# Patient Record
Sex: FEMALE | Race: BLACK OR AFRICAN AMERICAN | NOT HISPANIC OR LATINO | Employment: FULL TIME | URBAN - METROPOLITAN AREA
[De-identification: names, ages, dates, MRNs, and addresses within clinical notes are randomized per-mention and may not be internally consistent; named-entity substitution may affect disease eponyms.]

---

## 2024-03-07 ENCOUNTER — TELEPHONE (OUTPATIENT)
Dept: FAMILY MEDICINE | Facility: CLINIC | Age: 34
End: 2024-03-07

## 2024-03-07 ENCOUNTER — OFFICE VISIT (OUTPATIENT)
Dept: FAMILY MEDICINE | Facility: CLINIC | Age: 34
End: 2024-03-07
Payer: COMMERCIAL

## 2024-03-07 VITALS
DIASTOLIC BLOOD PRESSURE: 76 MMHG | TEMPERATURE: 98 F | HEIGHT: 68 IN | SYSTOLIC BLOOD PRESSURE: 119 MMHG | HEART RATE: 87 BPM | BODY MASS INDEX: 33.04 KG/M2 | OXYGEN SATURATION: 99 % | WEIGHT: 218 LBS

## 2024-03-07 DIAGNOSIS — R30.0 DYSURIA: ICD-10-CM

## 2024-03-07 DIAGNOSIS — Z32.01 PREGNANCY TEST POSITIVE: ICD-10-CM

## 2024-03-07 DIAGNOSIS — N89.8 VAGINAL DISCHARGE: Primary | ICD-10-CM

## 2024-03-07 DIAGNOSIS — E55.9 VITAMIN D DEFICIENCY: ICD-10-CM

## 2024-03-07 DIAGNOSIS — D50.9 IRON DEFICIENCY ANEMIA, UNSPECIFIED IRON DEFICIENCY ANEMIA TYPE: ICD-10-CM

## 2024-03-07 PROBLEM — N93.9 ABNORMAL UTERINE BLEEDING: Status: ACTIVE | Noted: 2017-04-03

## 2024-03-07 PROBLEM — M25.561 CHRONIC PAIN OF RIGHT KNEE: Status: ACTIVE | Noted: 2019-10-31

## 2024-03-07 PROBLEM — E66.9 OBESITY (BMI 30-39.9): Status: ACTIVE | Noted: 2017-04-03

## 2024-03-07 PROBLEM — G89.29 CHRONIC PAIN OF RIGHT KNEE: Status: ACTIVE | Noted: 2019-10-31

## 2024-03-07 LAB
ALBUMIN UR-MCNC: NEGATIVE MG/DL
APPEARANCE UR: CLEAR
BASOPHILS # BLD AUTO: 0.1 10E3/UL (ref 0–0.2)
BASOPHILS NFR BLD AUTO: 1 %
BILIRUB UR QL STRIP: NEGATIVE
CLUE CELLS: ABNORMAL
COLOR UR AUTO: YELLOW
EOSINOPHIL # BLD AUTO: 0.1 10E3/UL (ref 0–0.7)
EOSINOPHIL NFR BLD AUTO: 2 %
ERYTHROCYTE [DISTWIDTH] IN BLOOD BY AUTOMATED COUNT: 21.2 % (ref 10–15)
GLUCOSE UR STRIP-MCNC: NEGATIVE MG/DL
HCG UR QL: POSITIVE
HCT VFR BLD AUTO: 23.4 % (ref 35–47)
HGB BLD-MCNC: 6.3 G/DL (ref 11.7–15.7)
HGB UR QL STRIP: NEGATIVE
IMM GRANULOCYTES # BLD: 0 10E3/UL
IMM GRANULOCYTES NFR BLD: 0 %
KETONES UR STRIP-MCNC: NEGATIVE MG/DL
LEUKOCYTE ESTERASE UR QL STRIP: NEGATIVE
LYMPHOCYTES # BLD AUTO: 2.7 10E3/UL (ref 0.8–5.3)
LYMPHOCYTES NFR BLD AUTO: 31 %
MCH RBC QN AUTO: 15.1 PG (ref 26.5–33)
MCHC RBC AUTO-ENTMCNC: 26.9 G/DL (ref 31.5–36.5)
MCV RBC AUTO: 56 FL (ref 78–100)
MONOCYTES # BLD AUTO: 0.5 10E3/UL (ref 0–1.3)
MONOCYTES NFR BLD AUTO: 6 %
NEUTROPHILS # BLD AUTO: 5.3 10E3/UL (ref 1.6–8.3)
NEUTROPHILS NFR BLD AUTO: 61 %
NITRATE UR QL: NEGATIVE
PH UR STRIP: 6 [PH] (ref 5–8)
PLATELET # BLD AUTO: 834 10E3/UL (ref 150–450)
RBC # BLD AUTO: 4.16 10E6/UL (ref 3.8–5.2)
SP GR UR STRIP: 1.02 (ref 1–1.03)
TRICHOMONAS, WET PREP: ABNORMAL
UROBILINOGEN UR STRIP-ACNC: 0.2 E.U./DL
WBC # BLD AUTO: 8.8 10E3/UL (ref 4–11)
WBC'S/HIGH POWER FIELD, WET PREP: ABNORMAL
YEAST, WET PREP: ABNORMAL

## 2024-03-07 PROCEDURE — 82306 VITAMIN D 25 HYDROXY: CPT | Performed by: FAMILY MEDICINE

## 2024-03-07 PROCEDURE — 87210 SMEAR WET MOUNT SALINE/INK: CPT | Performed by: FAMILY MEDICINE

## 2024-03-07 PROCEDURE — 81025 URINE PREGNANCY TEST: CPT | Performed by: FAMILY MEDICINE

## 2024-03-07 PROCEDURE — 99203 OFFICE O/P NEW LOW 30 MIN: CPT | Performed by: FAMILY MEDICINE

## 2024-03-07 PROCEDURE — 81003 URINALYSIS AUTO W/O SCOPE: CPT | Performed by: FAMILY MEDICINE

## 2024-03-07 PROCEDURE — 83540 ASSAY OF IRON: CPT | Performed by: FAMILY MEDICINE

## 2024-03-07 PROCEDURE — 83550 IRON BINDING TEST: CPT | Performed by: FAMILY MEDICINE

## 2024-03-07 PROCEDURE — 36415 COLL VENOUS BLD VENIPUNCTURE: CPT | Performed by: FAMILY MEDICINE

## 2024-03-07 PROCEDURE — 85025 COMPLETE CBC W/AUTO DIFF WBC: CPT | Performed by: FAMILY MEDICINE

## 2024-03-07 PROCEDURE — 82728 ASSAY OF FERRITIN: CPT | Performed by: FAMILY MEDICINE

## 2024-03-07 RX ORDER — DOCUSATE SODIUM 100 MG/1
1 CAPSULE, LIQUID FILLED ORAL DAILY
COMMUNITY
Start: 2023-09-07 | End: 2024-03-07

## 2024-03-07 RX ORDER — FERROUS SULFATE 325(65) MG
325 TABLET ORAL 2 TIMES DAILY
Qty: 180 TABLET | Refills: 3 | Status: SHIPPED | OUTPATIENT
Start: 2024-03-07

## 2024-03-07 RX ORDER — AZELAIC ACID 0.15 G/G
GEL TOPICAL 2 TIMES DAILY
COMMUNITY
Start: 2023-08-28 | End: 2024-03-07

## 2024-03-07 RX ORDER — CICLOPIROX 1 G/100ML
SHAMPOO TOPICAL
COMMUNITY
Start: 2023-08-28 | End: 2024-03-07

## 2024-03-07 RX ORDER — TRETINOIN 0.25 MG/G
CREAM TOPICAL
COMMUNITY
Start: 2023-08-28 | End: 2024-03-07

## 2024-03-07 RX ORDER — CHOLECALCIFEROL (VITAMIN D3) 1250 MCG
50000 CAPSULE ORAL
COMMUNITY
Start: 2023-09-07 | End: 2024-03-07

## 2024-03-07 RX ORDER — CLINDAMYCIN PHOSPHATE 10 UG/ML
LOTION TOPICAL EVERY MORNING
COMMUNITY
Start: 2023-08-28 | End: 2024-03-07

## 2024-03-07 RX ORDER — FLUOCINOLONE ACETONIDE 0.11 MG/ML
OIL TOPICAL
COMMUNITY
Start: 2023-08-29 | End: 2024-03-07

## 2024-03-07 RX ORDER — BETAMETHASONE DIPROPIONATE 0.5 MG/G
LOTION TOPICAL 2 TIMES DAILY
COMMUNITY
Start: 2023-08-28 | End: 2024-03-07

## 2024-03-07 RX ORDER — FLUCONAZOLE 150 MG/1
1 TABLET ORAL DAILY
COMMUNITY
Start: 2023-08-03 | End: 2024-03-07

## 2024-03-07 RX ORDER — VITAMIN B COMPLEX
2000 TABLET ORAL
COMMUNITY

## 2024-03-07 RX ORDER — MINOXIDIL 2.5 MG/1
2.5 TABLET ORAL DAILY
COMMUNITY
Start: 2023-08-28 | End: 2024-03-07

## 2024-03-07 ASSESSMENT — PAIN SCALES - GENERAL: PAINLEVEL: NO PAIN (0)

## 2024-03-07 NOTE — TELEPHONE ENCOUNTER
Unplanned pregnancy detected today in office visit   Pregnancy test positive  Unexpected pregnancy, LMP 24 (6 weeks into pregnancy). She is interested in aborting pregnancy. Urgent referral to OB per her request.   6w1d by LMP    Pre-Medication  Assessment:    Tiffaine Lopez does not wish to proceed with this unplanned pregnancy. Did not give a reason why.      When was the first day of your last period? Patient's last menstrual period was 2024 (exact date). Patient is sure of LMP date.    When was your positive pregnancy test? 3/7/2024       Was the test more than 52 days ago (before 1/15/24)? No    Were you using hormonal birth control, an IUD or emergency contraception when you got pregnant? No    Have you ever been diagnosed with an ectopic pregnancy? No    Have you ever had a tubal ligation or sterilization procedure? No    Have you ever been diagnosed with pelvic inflammatory disease? No    Are you having one-sided pelvic pain? No    How long are your typical menstrual cycles /  How many days from the start of one period to the start of the next one?  30-31 days     During the past 3 months, has the time between periods varied from your typical cycles by more than a few days? No, cycles have been regular.    Was LMP more than 10 weeks (70 days) ago (before 23)? No    Did your last period start earlier or later than you would have expected? No    Was your last period shorter than usual? No    Was the amount of bleeding/cramping in your last period normal for you? Yes    Have you had any other recent bleeding that was not normal for you? No      Have you ever been diagnosed with:    An allergy or intolerance to mifepristone or misoprostol?  No    Chronic renal failure?  No    Hemorrhagic disorders? No    Inherited porphyria? No    Have you used systemic corticosteroid therapy long term?  No    Are you currently on anticoagulation therapy (excluding aspirin)?  No    Based on the  responses given by the patient, an ultrasound is not indicated.    Patient denies all contraindications, will proceed with scheduling medication termination of pregnancy appointment.    Pt does not need US; scheduled with a TOP provider Dr Mustafa in [place] 3/8 at 1100  Explained what to expect.    Mallorie Upton RN

## 2024-03-07 NOTE — PROGRESS NOTES
"  Assessment & Plan     Vaginal discharge  Check wet prep for evaluation of yeast versus BV.  Based on results we will treat appropriately.  Could also be related to pregnancy.  - Wet prep - Clinic Collect    Dysuria  UA negative in clinic.  - UA Macroscopic with reflex to Microscopic and Culture; Future    Iron deficiency anemia, unspecified iron deficiency anemia type  Check iron studies and notify patient with results.  Patient may need to be on ferrous sulfate 325 mg daily with food.  Hemoglobin 6.3. Advised iron supplementation and 1 month follow up for repeat CBC. She may need blood transfusion if she needs a termination of pregnancy, I will defer to OB for their opinion.  - Ferritin; Future  - CBC with Platelets & Differential; Future  - Iron and iron binding capacity; Future    Vitamin D deficiency  Check vitamin D.  If vitamin D level is normal then she can transition to vitamin D3 1000 IU daily with food.  - Vitamin D Deficiency; Future    Pregnancy test positive  Unexpected pregnancy, LMP 1/24/24 (6 weeks into pregnancy). She is interested in aborting pregnancy. Urgent referral to OB per her request.               BMI  Estimated body mass index is 33.15 kg/m  as calculated from the following:    Height as of this encounter: 1.727 m (5' 8\").    Weight as of this encounter: 98.9 kg (218 lb).   Weight management plan: Patient was referred to their PCP to discuss a diet and exercise plan.          Asif Moreno is a 33 year old, presenting for the following health issues:  UTI (Experiencing frequent urination, burning sensation, concern for yeast infection, cloudy urine), Anemia (Was told to take iron supplements, has only been taking colace due to confusion with prescription), and Medication Request (Would like to discuss starting birth control pills)        3/7/2024    10:41 AM   Additional Questions   Roomed by [facility]ia, Visit Facilitator   Accompanied by Son     UTI    History of Present Illness " "      Reason for visit:  Uti or yeast    She eats 0-1 servings of fruits and vegetables daily.She consumes 1 sweetened beverage(s) daily.She exercises with enough effort to increase her heart rate 10 to 19 minutes per day.  She exercises with enough effort to increase her heart rate 3 or less days per week. She is missing 2 dose(s) of medications per week.     Vaginal discharge: Patient has noticed increased vaginal discharge, which is similar to previous yeast infections in the past.  1 week ago she was seen for virtual visit at [facility] and prescribed fluconazole 2 doses, which did help symptoms slightly, but there is still some white discharge.  She denies any odor.  There is some suprapubic pressure after urinating.    Anemia: Went to the dermatologist and had blood test that showed iron deficiency. She was has been taking Colace thinking it was iron tablets for about the last 6 months, but 2 weeks ago realized that it was not iron that she was taking. She has history of iron deficiency. Periods sometimes are heavy for 1-2 days.    Vitamin D: She has been taking vitamin D 50,000 international unit(s) weekly for the last 6 months, although she has not taken it for the last 2 weeks.      OCP: Last period was in 1.5 to 2 months ago, which is not atypical for her.  She has been on oral contraceptive pills in the past.  Last pill she was on was 1 where she had a period quarterly and she did not like that, so she discontinued.  She is interested in getting back onto oral contraceptive tablets.  She does not smoke.  She does not have migraines.              Review of Systems  Constitutional, HEENT, cardiovascular, pulmonary, gi and gu systems are negative, except as otherwise noted.      Objective    /76 (BP Location: Left arm, Patient Position: Sitting, Cuff Size: Adult Large)   Pulse 87   Temp 98  F (36.7  C) (Oral)   Ht 1.727 m (5' 8\")   Wt 98.9 kg (218 lb)   LMP 01/24/2024   SpO2 99%   BMI 33.15 " kg/m    Body mass index is 33.15 kg/m .  Physical Exam   GENERAL: alert and no distress  EYES: Eyes grossly normal to inspection, PERRL and conjunctivae and sclerae normal  PSYCH: mentation appears normal, affect normal/bright            Signed Electronically by: Ignacio Forman MD

## 2024-03-08 ENCOUNTER — OFFICE VISIT (OUTPATIENT)
Dept: PEDIATRICS | Facility: CLINIC | Age: 34
End: 2024-03-08
Payer: COMMERCIAL

## 2024-03-08 VITALS
RESPIRATION RATE: 16 BRPM | HEART RATE: 87 BPM | WEIGHT: 216.2 LBS | TEMPERATURE: 98.5 F | BODY MASS INDEX: 32.77 KG/M2 | SYSTOLIC BLOOD PRESSURE: 123 MMHG | DIASTOLIC BLOOD PRESSURE: 72 MMHG | HEIGHT: 68 IN | OXYGEN SATURATION: 97 %

## 2024-03-08 DIAGNOSIS — Z33.2 TERMINATION OF PREGNANCY (FETUS): Primary | ICD-10-CM

## 2024-03-08 LAB
FERRITIN SERPL-MCNC: 21 NG/ML (ref 6–175)
IRON BINDING CAPACITY (ROCHE): 530 UG/DL (ref 240–430)
IRON SATN MFR SERPL: 2 % (ref 15–46)
IRON SERPL-MCNC: 12 UG/DL (ref 37–145)
VIT D+METAB SERPL-MCNC: 46 NG/ML (ref 20–50)

## 2024-03-08 PROCEDURE — 99214 OFFICE O/P EST MOD 30 MIN: CPT | Performed by: STUDENT IN AN ORGANIZED HEALTH CARE EDUCATION/TRAINING PROGRAM

## 2024-03-08 PROCEDURE — 84702 CHORIONIC GONADOTROPIN TEST: CPT | Performed by: STUDENT IN AN ORGANIZED HEALTH CARE EDUCATION/TRAINING PROGRAM

## 2024-03-08 PROCEDURE — 36415 COLL VENOUS BLD VENIPUNCTURE: CPT | Performed by: STUDENT IN AN ORGANIZED HEALTH CARE EDUCATION/TRAINING PROGRAM

## 2024-03-08 PROCEDURE — S0190 MIFEPRISTONE, ORAL, 200 MG: HCPCS | Performed by: STUDENT IN AN ORGANIZED HEALTH CARE EDUCATION/TRAINING PROGRAM

## 2024-03-08 RX ORDER — MISOPROSTOL 200 UG/1
800 TABLET ORAL ONCE
Qty: 4 TABLET | Refills: 1 | Status: SHIPPED | OUTPATIENT
Start: 2024-03-08 | End: 2024-03-08

## 2024-03-08 RX ORDER — ONDANSETRON 4 MG/1
4 TABLET, FILM COATED ORAL EVERY 6 HOURS PRN
Qty: 10 TABLET | Refills: 0 | Status: SHIPPED | OUTPATIENT
Start: 2024-03-08

## 2024-03-08 RX ORDER — MIFEPRISTONE 200 MG/1
200 TABLET ORAL ONCE
Status: COMPLETED | OUTPATIENT
Start: 2024-03-08 | End: 2024-03-08

## 2024-03-08 RX ADMIN — MIFEPRISTONE 200 MG: 200 TABLET ORAL at 12:09

## 2024-03-08 ASSESSMENT — PAIN SCALES - GENERAL: PAINLEVEL: NO PAIN (0)

## 2024-03-08 NOTE — PATIENT INSTRUCTIONS
MEDICATION TERMINATION OF PREGNANCY USING MIFEPRISTONE AND MISOPROSTOL    HOW DOES IT WORK?    Mifepristone and misoprostol together are medications that can be taken to end your pregnancy.      HOW WELL DOES THE MEDICATION WORK?    Medication  is highly effective. Medication  is highly effective; it has a success rate of >95% using the standard protocol. Medication  is safe. Serious complications requiring hospitalization for infection treatment or transfusion occur in <0.4% of patients under the standard protocol?. Failed or incomplete medication  may require a suction procedure to complete.    WHAT DO I DO?    You took one tablet of 200 mg mifepristone today during your visit or will take it at home as directed by your provider.   After taking the mifepristone, use the misoprostol.  There are two ways to take misoprostol: vaginal or buccal (between cheek and gum in your mouth).   Vaginal Use of Misoprostol (may be inserted immediately or up to 48 hrs after mifepristone)   Wash your hands and lie down. Place the 4 misoprostol tablets one at a time up into the vagina as far as you can using your finger. It doesn t matter where in the vagina you put the pills. Each misoprostol pill contains 200 micrograms.    If your provider has recommended a second dose of misoprostol, repeat the process 4 hours later with an additional 4 tablets of misoprostol. If you have started to bleed, you can put the pills in your cheeks (between your cheek and your gums), instead of your vagina, for 30 minutes. See  Buccal Use of Misoprostol  below.    Buccal Use of Misoprostol (should be taken between 24-48 hrs after mifepristone)   Place 2 tablets of misoprostol in each cheek (between your cheek and your gums), four tablets total.    Leave them in your cheeks for 30 minutes to dissolve.    After 30 minutes swallow the pills with a large glass of water.   If your provider has recommended a second dose of  misoprostol, repeat steps i. through iii. above after 4 hours.    You may take ibuprofen (800 mg total) and/or acetaminophen (650mg) by mouth one hour before using the misoprostol. This may help with cramping. See further information on pain management below.       WHAT HAPPENS NEXT?   Vaginal bleeding with clots is an expected part of this process. The cramps and bleeding may be stronger than your normal period. The cramps and heavy bleeding usually start 2 to 4 hours after you use the misoprostol pills. This heavy bleeding means the pills are working. After the pregnancy tissue passes, the bleeding will slow down and get lighter and lighter. It is normal to pass small clots and sometimes the bleeding may seem to increase when you get up suddenly or go to the toilet. Bleeding may continue on and off for up to 4 weeks.     Lower abdominal cramping pain, especially in the middle of your lower abdomen can occur any time after you take the misoprostol.? Cramping may be similar or sometimes much greater than with a menstrual period and can last for a couple of days. If you continue to have pain and cramps after taking the ibuprofen (as directed above), then you can use additional pain medicine such as acetaminophen (Tylenol).?   Nausea, diarrhea: These symptoms should get better a few hours after using the pills and should not last longer than 24 hours.    Fever and chills: You may have fever and chills the day you take the misoprostol. It is NOT normal to have a fever after that. Call us right away if you do. It could be a sign that you are getting an infection.   You will receive a phone call from a clinic nurse within a week of your visit.    You can expect a period in 4 to 8 weeks after using mifepristone and misoprostol but this varies quite a bit depending upon a person s normal menstrual cycle.      WHAT CAN I TAKE FOR PAIN, NAUSEA, DIARRHEA?    Pain:    Take ibuprofen (Motrin or Advil) 800 mg every 8 hours as  needed for pain. Take this with food to avoid an upset stomach. You may also alternate this with acetaminophen (Tylenol) 650mg every 6 hours for added pain control.   Comfort: A hot pack may help with cramps. You can also drink some hot tea. Rest in a soothing place.    Nausea   Take ondansetron or promethazine as needed for nausea and vomiting as needed for nausea and vomiting if prescribed.   Diarrhea   Take Loperamide as needed for diarrhea. Take 2 capsules after the first loose bowel movement. Can take 1 capsule after each additional loose stool. Do not take more than 8 capsules in a day.     WHEN SHOULD I CALL MY HEALTHCARE PROVIDER?    Your bleeding soaks through more than 2 maxi pads per hour for 2 hours in a row   You do not bleed within 24 hours after taking the misoprostol   You continue to feel sick more than 24 hours after taking the misoprostol:   Fever on an oral thermometer of 100.4 degrees Fahrenheit, severe stomach area (abdominal) pain, weakness, nausea, vomiting, or diarrhea     The clinic phone is answered 24 hours per day. If it is after clinic hours, leave a message with the answering service and a health care provider will call you back. Please call if you have any questions, think something is going wrong, or think you have an emergency. If you do not receive a call back within an hour, go to the nearest emergency room.              FEELINGS AFTER ENDING PREGNANCY    People have many different feelings after using the medications to end a pregnancy. The most common emotion people report is relief, but people can also feel many other emotions. If you think your emotions are not what they should be, please talk to us.        References:   American College of Obstetricians and Gynecologists  Committee on Practice Bulletins--Gynecology, Society of Family Planning. Medication  Up to 70 Days of Gestation: ACOG Practice Bulletin, Number 225. Obstet Gynecol. 2020;136(4):e31-e47. doi:  10.1097/AOG.2770896995128781.    Reproductive Health Access Project. Medication  Aftercare Instructions (vaginal miso). May 2022. Available at: https://www.reproductiveaccess.org/wp-content/uploads/9270-04-Ijppluafu_QpadsnqApkvVge-final.pdf   Reproductive Health Access Project. Medication  Aftercare Instructions (buccal miso). May 2022. Available at: https://www.reproductiveaccess.org/wp-content/uploads/-english-buccal-med-ab-aftercare_final.pdf

## 2024-03-08 NOTE — PROGRESS NOTES
"3/8/2024           Subjective:  Marsha Lopez is a 33 year old  at Unknown here today for medication .  Patient has been counseled on pregnancy options and desires medical termination of pregnancy.       Objective:  Dating:  Patient's last menstrual period was 2024 (exact date).      OB History    Para Term  AB Living   1 1 1 0 0 1   SAB IAB Ectopic Multiple Live Births   0 0 0 0 1      # Outcome Date GA Lbr Jerson/2nd Weight Sex Delivery Anes PTL Lv   1 Term 08/18/15 40w6d / 01:11 3.374 kg (7 lb 7 oz) M Vag-Spont EPI N VINH      Name: WHITE,MALE-MARSHA      Apgar1: 8  Apgar5: 9        No past medical history on file.    No past surgical history on file.    Current Outpatient Medications   Medication    ferrous sulfate (FEROSUL) 325 (65 Fe) MG tablet    Vitamin D3 (CHOLECALCIFEROL) 25 mcg (1000 units) tablet    misoprostol (CYTOTEC) 200 MCG tablet    ondansetron (ZOFRAN) 4 MG tablet     Current Facility-Administered Medications   Medication    miFEPRIStone (MIFEPREX) tablet 200 mg       No Known Allergies    Social History     Tobacco Use    Smoking status: Never     Passive exposure: Never    Smokeless tobacco: Never   Vaping Use    Vaping Use: Never used   Substance Use Topics    Alcohol use: No    Drug use: No        Vitals:    24 1052   BP: 123/72   BP Location: Right arm   Patient Position: Sitting   Cuff Size: Adult Large   Pulse: 87   Resp: 16   Temp: 98.5  F (36.9  C)   TempSrc: Tympanic   SpO2: 97%   Weight: 98.1 kg (216 lb 3.2 oz)   Height: 1.727 m (5' 8\")       Estimated body mass index is 32.87 kg/m  as calculated from the following:    Height as of this encounter: 1.727 m (5' 8\").    Weight as of this encounter: 98.1 kg (216 lb 3.2 oz).     Gen: well-appearing, cooperative, well-groomed      Assessment and Plan:  Marsha Lopez is a 33 year old  at Unknown as dated by LMP who desires termination of pregnancy with medication .     Based the " pre-medication  ultrasound assessment, ultrasound prior to  IS indicated.     The patient is appropriate for medication  with:  mifepristone and misoprostol.     Prior to the administration/prescribing of mifepristone, the patient received the medication guide and signed the patient agreement.      Mifepristone administered in clinic. Patient plans to take misoprostol by vaginal route.     .    Counseled patient on   - expected bleeding: Bleeding typically starts 2-4 hours after misoprostol and can be heavy for up to 2 hours. Once pregnancy tissue passes, bleeding should slow down to menstrual type flow but can last up to 2 weeks. Patient counseled to contact our clinic or present to Emergency Department in the case of heavy bleeding (soaking more than two maxi pads per hour for 2 consecutive hours). Also counseled to contact clinic if no bleeding within 24 hours after taking misoprostol.  - pain: Counseled patient that the most severe pain occurs approximately 2-4 hours after misoprostol use and lasts 1-2 hours. Advised patient to take ibuprofen 800 mg with misoprostol and repeat as needed every 8 hours. Patient may also alternate with acetaminophen for added pain control (acetaminophen 650 mg every 6 hours).   - potential side effects of misoprostol: nausea, vomiting, diarrhea, headache, dizziness, and thermoregulatory effects such as fever, warmth, hot flushes, or chills  - follow up plan options:   Home Pregnancy Test - Take a urine pregnancy test four weeks after taking  medication(s).  Blood Test - Get a blood test on the day of your appointment or the day you take your first dose of medication to measure the amount of pregnancy hormone (HCG) in your blood.  Get another HCG blood test 1-2 weeks after taking  medication(s).   Ultrasound - Get a vaginal ultrasound 1-2 weeks after taking  medication(s).  Patient plans two blood tests for follow up.  Also offered to  discuss any contraceptive needs/plans with the patient today. Patient plans oral contraceptives.    Chart routed to RN team ([facility] Triage [id number]) who will follow up with patient via telephone within 1 week after clinic visit to assess patient.              Piper Mustafa MD  Internal Medicine & Pediatrics  [facility]  She/her

## 2024-03-09 ENCOUNTER — HEALTH MAINTENANCE LETTER (OUTPATIENT)
Age: 34
End: 2024-03-09

## 2024-03-09 LAB — HCG INTACT+B SERPL-ACNC: ABNORMAL MIU/ML

## 2024-03-11 ENCOUNTER — TELEPHONE (OUTPATIENT)
Dept: PEDIATRICS | Facility: CLINIC | Age: 34
End: 2024-03-11
Payer: COMMERCIAL

## 2024-03-11 NOTE — TELEPHONE ENCOUNTER
Medication Termination of Pregnancy Follow Up Assessment    Tiffanie Lopez is 33 year old who had a medication . Patient took misoprostol on 3/10/2024.    Pt had heavier bleeding, passing of clots and tissue, states bleeding now is lighter, changing pad roughly every 2-3 hours.    Bleeding: Patient reports MILD bleeding: less than 1 pad per hour.    Infection: Patient denies signs/symptoms of infection, including fever.    Coping:  Patient reports she is coping well.  Patient reports feeling supported at home.      Follow Up:   Patient is doing  HCG on 3/25/2024  for follow up.  Results expected: folling lab completion.  Results will be sent to provider to finalize follow-up plan.  Patient to expect call back with results / plan.    Patient encouraged to call triage as needed.     Alayna Bills RN

## 2024-12-03 ENCOUNTER — OFFICE VISIT (OUTPATIENT)
Dept: FAMILY MEDICINE | Facility: CLINIC | Age: 34
End: 2024-12-03
Payer: COMMERCIAL

## 2024-12-03 VITALS
HEIGHT: 70 IN | BODY MASS INDEX: 32.16 KG/M2 | TEMPERATURE: 97.3 F | SYSTOLIC BLOOD PRESSURE: 132 MMHG | RESPIRATION RATE: 16 BRPM | DIASTOLIC BLOOD PRESSURE: 84 MMHG | OXYGEN SATURATION: 99 % | HEART RATE: 70 BPM | WEIGHT: 224.6 LBS

## 2024-12-03 DIAGNOSIS — Z11.4 SCREENING FOR HIV (HUMAN IMMUNODEFICIENCY VIRUS): ICD-10-CM

## 2024-12-03 DIAGNOSIS — D50.9 IRON DEFICIENCY ANEMIA, UNSPECIFIED IRON DEFICIENCY ANEMIA TYPE: ICD-10-CM

## 2024-12-03 DIAGNOSIS — H00.012 HORDEOLUM EXTERNUM OF RIGHT LOWER EYELID: Primary | ICD-10-CM

## 2024-12-03 DIAGNOSIS — Z11.59 NEED FOR HEPATITIS C SCREENING TEST: ICD-10-CM

## 2024-12-03 LAB
BASOPHILS # BLD AUTO: 0 10E3/UL (ref 0–0.2)
BASOPHILS NFR BLD AUTO: 1 %
EOSINOPHIL # BLD AUTO: 0.1 10E3/UL (ref 0–0.7)
EOSINOPHIL NFR BLD AUTO: 2 %
ERYTHROCYTE [DISTWIDTH] IN BLOOD BY AUTOMATED COUNT: 18.9 % (ref 10–15)
FERRITIN SERPL-MCNC: 12 NG/ML (ref 6–175)
HCT VFR BLD AUTO: 25.2 % (ref 35–47)
HCV AB SERPL QL IA: NONREACTIVE
HGB BLD-MCNC: 7.2 G/DL (ref 11.7–15.7)
IMM GRANULOCYTES # BLD: 0 10E3/UL
IMM GRANULOCYTES NFR BLD: 0 %
IRON BINDING CAPACITY (ROCHE): 436 UG/DL (ref 240–430)
IRON SATN MFR SERPL: 4 % (ref 15–46)
IRON SERPL-MCNC: 17 UG/DL (ref 37–145)
LYMPHOCYTES # BLD AUTO: 2 10E3/UL (ref 0.8–5.3)
LYMPHOCYTES NFR BLD AUTO: 41 %
MCH RBC QN AUTO: 17.6 PG (ref 26.5–33)
MCHC RBC AUTO-ENTMCNC: 28.6 G/DL (ref 31.5–36.5)
MCV RBC AUTO: 62 FL (ref 78–100)
MONOCYTES # BLD AUTO: 0.3 10E3/UL (ref 0–1.3)
MONOCYTES NFR BLD AUTO: 7 %
NEUTROPHILS # BLD AUTO: 2.4 10E3/UL (ref 1.6–8.3)
NEUTROPHILS NFR BLD AUTO: 49 %
PLATELET # BLD AUTO: 693 10E3/UL (ref 150–450)
RBC # BLD AUTO: 4.09 10E6/UL (ref 3.8–5.2)
WBC # BLD AUTO: 5 10E3/UL (ref 4–11)

## 2024-12-03 PROCEDURE — 83540 ASSAY OF IRON: CPT | Performed by: FAMILY MEDICINE

## 2024-12-03 PROCEDURE — 86803 HEPATITIS C AB TEST: CPT | Performed by: FAMILY MEDICINE

## 2024-12-03 PROCEDURE — 83550 IRON BINDING TEST: CPT | Performed by: FAMILY MEDICINE

## 2024-12-03 PROCEDURE — 82728 ASSAY OF FERRITIN: CPT | Performed by: FAMILY MEDICINE

## 2024-12-03 PROCEDURE — 99214 OFFICE O/P EST MOD 30 MIN: CPT | Performed by: FAMILY MEDICINE

## 2024-12-03 PROCEDURE — 85025 COMPLETE CBC W/AUTO DIFF WBC: CPT | Performed by: FAMILY MEDICINE

## 2024-12-03 PROCEDURE — 87389 HIV-1 AG W/HIV-1&-2 AB AG IA: CPT | Performed by: FAMILY MEDICINE

## 2024-12-03 PROCEDURE — G2211 COMPLEX E/M VISIT ADD ON: HCPCS | Performed by: FAMILY MEDICINE

## 2024-12-03 PROCEDURE — 36415 COLL VENOUS BLD VENIPUNCTURE: CPT | Performed by: FAMILY MEDICINE

## 2024-12-03 RX ORDER — DOCUSATE SODIUM 100 MG/1
100 CAPSULE, LIQUID FILLED ORAL DAILY
COMMUNITY
Start: 2024-04-20

## 2024-12-03 RX ORDER — SULFACETAMIDE SODIUM 100 MG/ML
1-2 SOLUTION/ DROPS OPHTHALMIC
Qty: 5 ML | Refills: 0 | Status: SHIPPED | OUTPATIENT
Start: 2024-12-03

## 2024-12-03 ASSESSMENT — PAIN SCALES - GENERAL: PAINLEVEL_OUTOF10: MODERATE PAIN (5)

## 2024-12-03 NOTE — PROGRESS NOTES
"  Assessment & Plan     Hordeolum externum of right lower eyelid  New problem.  Advised patient to do warm compresses for 10 to 15 minutes up to 3 times a day if possible.  Prescription for antibiotic eyedrops.  - sulfacetamide (BLEPH-10) 10 % ophthalmic solution; Place 1-2 drops into the right eye every 2 hours (while awake).    Iron deficiency anemia, unspecified iron deficiency anemia type  Check labs and notify with results.  Based on results will determine appropriate frequency of iron supplementation.  Hemoglobin is improved from 6.2 to now 7.2, although not significantly increased from taking regular iron supplementation for the last 9 months.  Her platelets are also elevated.  We will set patient up for a GI referral to evaluate possible sources of bleeding.  Advised patient to take iron supplementation regularly again.  - CBC with Platelets & Differential; Future  - Iron and iron binding capacity; Future  - Ferritin; Future      The longitudinal plan of care for the diagnosis(es)/condition(s) as documented were addressed during this visit. Due to the added complexity in care, I will continue to support Tiffanie in the subsequent management and with ongoing continuity of care.      BMI  Estimated body mass index is 32.56 kg/m  as calculated from the following:    Height as of this encounter: 1.769 m (5' 9.65\").    Weight as of this encounter: 101.9 kg (224 lb 9.6 oz).             Subjective   Tiffanie is a 34 year old, presenting for the following health issues:  Stye / Hordeolum Evaluation (Patient is here for swelling of the eye, patient states its painful and itchy that started Sunday morning. Not fasting. )        12/3/2024     9:43 AM   Additional Questions   Roomed by luis matamoros cma   Accompanied by self     History of Present Illness       Reason for visit:  Stye on eye      Stye in right lower lid for the past 2 days, she has tried warm compresses without improvement. Stye symptoms are getting " "worse, pressure with burning when she blinks, started itching yesterday.  She used to get styes regularly, but nothing for the last 2 years.    Iron deficiency anemia: Patient had significant anemia where her hemoglobin was 6.2 in March with low iron.  She has 5 days of bleeding every month with her menstrual cycle and she states that only 1 day is heavy.  She was taking iron consistently, now is taking iron supplementation every couple days for the last couple months because of constipation and causing her to feel tired.  She would like to have her iron levels checked.  She does not take NSAIDs.              Review of Systems  No chest pain, no shortness of breath, no lightheadedness, no hematochezia.        Objective    /84 (BP Location: Left arm, Patient Position: Sitting, Cuff Size: Adult Large)   Pulse 70   Temp 97.3  F (36.3  C) (Temporal)   Resp 16   Ht 1.769 m (5' 9.65\")   Wt 101.9 kg (224 lb 9.6 oz)   LMP 11/16/2024   SpO2 99%   BMI 32.56 kg/m    Body mass index is 32.56 kg/m .  Physical Exam   GENERAL: alert and no distress  EYES: eyelids- hordeolum/sty right eye, lower lid, medial aspect, conjunctiva/corneas- normal, and pupils- normal  PSYCH: mentation appears normal, affect normal/bright            Signed Electronically by: Ignacio Forman MD    "

## 2024-12-03 NOTE — ADDENDUM NOTE
Addended by: CONCETTA TAY on: 12/3/2024 11:00 AM     Modules accepted: Orders     Griseofulvin Pregnancy And Lactation Text: This medication is Pregnancy Category X and is known to cause serious birth defects. It is unknown if this medication is excreted in breast milk but breast feeding should be avoided.

## 2024-12-04 ENCOUNTER — TELEPHONE (OUTPATIENT)
Dept: GASTROENTEROLOGY | Facility: CLINIC | Age: 34
End: 2024-12-04

## 2024-12-04 LAB — HIV 1+2 AB+HIV1 P24 AG SERPL QL IA: NONREACTIVE

## 2024-12-04 NOTE — TELEPHONE ENCOUNTER
[facility] Call Center    Phone Message    May a detailed message be left on voicemail: Yes    Reason for Call: Other: Patient is currently scheduled on 0212/2025, as visit type New GI Urgent. This is outside the expected timeline for this referral. Patient has been added to the waitlist.      Action Taken: Message routed to:  Other: GI REFERRAL TRIAGE POOL     Travel Screening: Not Applicable

## 2024-12-23 NOTE — TELEPHONE ENCOUNTER
Clinic Navigator sent a [employer] message to inform the Pt that Pt is on the wait list for a sooner appointment. Clinic Navigator will reach out to the Pt via phone call or [employer] when a sooner appointment becomes available.

## 2025-01-07 NOTE — TELEPHONE ENCOUNTER
Writer called to  help get Pt scheduled for a sooner GI appointment. Pt accepted an appointment with Yaa Nguyễn on 1/8/2025 at 10:45 AM for a virtual visit. Writer explained the check in process to the Pt who expressed understanding.

## 2025-01-07 NOTE — TELEPHONE ENCOUNTER
REFERRAL INFORMATION:  Referring Provider:  Ignacio Forman MD   Referring Clinic:  [facility]/OB   Reason for Visit/Diagnosis: D50.9 (ICD-10-CM) - Iron deficiency anemia, unspecified iron deficiency anemia type      FUTURE VISIT INFORMATION:  Appointment Date: 1/8/25     NOTES STATUS DETAILS   OFFICE NOTE from Referring Provider Internal 12/3/24   MEDICATION LIST Internal    PROCEDURES     STOOL TESTING     LABS     PERTINENT LABS Internal    IMAGES

## 2025-01-08 ENCOUNTER — VIRTUAL VISIT (OUTPATIENT)
Dept: GASTROENTEROLOGY | Facility: CLINIC | Age: 35
End: 2025-01-08
Attending: FAMILY MEDICINE
Payer: COMMERCIAL

## 2025-01-08 ENCOUNTER — PRE VISIT (OUTPATIENT)
Dept: GASTROENTEROLOGY | Facility: CLINIC | Age: 35
End: 2025-01-08

## 2025-01-08 DIAGNOSIS — D50.9 IRON DEFICIENCY ANEMIA, UNSPECIFIED IRON DEFICIENCY ANEMIA TYPE: Primary | ICD-10-CM

## 2025-01-08 NOTE — PROGRESS NOTES
Virtual Visit Details    Type of service:  Video Visit     Originating Location (pt. Location): Home in [place]    Distant Location (provider location):  On-site  Platform used for Video Visit: Channing    Joined the call at 1/8/2025, 10:48:55 am.  Left the call at 1/8/2025, 11:21:59 am.  You were on the call for 33 minutes 4 seconds     GI CLINIC VISIT    CC/REFERRING MD:  Ignacio Forman  REASON FOR CONSULTATION: RUTH    ASSESSMENT/PLAN:  Tiffanie Lopez is a 34 year old year old female with PMHx significant to the  Physicians GI team for RUTH.  She reports long-standing iron deficiency since early adulthood, as early as 2013 to her recollection. Used iron tabs sporadically but also developed constipation with them. During pregnancy , she required a blood transfusion but denies other events requiring blood products. Never had IV iron infusion either.     She does report menorrhagia (not on OCP - stopped use secondary to irregular bleeding), having moderate to heavy bleeding (changing a tampon ultra and adult depends every hour for concern of leakage) for the first 3 days (duration 4-5d).      Endorses ongoing fatigue but denies dizziness, syncope, chest pain/pressure/tightness, SOB. No overt sx of GIB to include hematochezia, melena, hematemesis, abdominal pain.   Her mother is also anemic. She is not on blood thinners.     Recent CBC with Hgb 7.2/25.2, MCV 62 and ferritin level 12. In 3/2023, Hgb 6.3/23.4.     She otherwise feels well.     #RUTH  Discussed indication for GI specific work up to include bidirectional endoscopy and potentially VCE. Scope complications, r/b explained to patient.   -order h.pylori stool Ag  -order celiac serologies (eats gluten)    Future consideration  1.can consider autoimmune gastritis labs dependent on EGD findings - no fhx of gastric disease include gastric cancer   2. SB evaluation to be considered     Orders Placed This Encounter   Procedures    Tissue transglutaminase shahriar IgA and IgG     IgA    Helicobacter pylori Antigen Stool    Adult GI  Referral - Procedure Only     Colorectal cancer screening: await cscope ordered today     RTC 1-2 weeks after scopes     Thank you for this consultation.  It was a pleasure to participate in the care of this patient; please contact me with any further questions.     Yaa Nguyễn PA-C    Follow up: As planned above. Today, I personally spent 33 minutes in direct face to face time with the patient, of which greater than 50% of the time was spent in patient education and counseling as described above. Approximately 19 minutes were spent on indirect care associated with the patient's consultation including but not limited to review of: patient medical records to date, clinic visits, hospital records, lab results, imaging studies, procedural documentation, and coordinating care with other providers. The findings from this review are summarized in the above note. All of the above accounted for a cumulative time of 52 minutes and was performed on the date of service.     CAMILO Lopez is a 34 year old year old female seeing the [facility] GI group for RUTH.     Hx  First told of RUTH back in 2013 told to take PO iron but use was erratic  During pregnancy - told she had iron deficiency as well . Required blood transfusion with delivery.   -not told anemic as teenager     No nose bleeds, hemoptysis, hemetesis, hematuria, hematochezia, black/melena like stools   Does have some occassional brising after bump extermites but nothing extensive     Menstural cycle -   -Off OCP (did not like unpredictability) - having to wear Depends as its so heavy.   -Tampon (ultra) and depends - changed every hour collective over 2 days   First 3 days she will have moderate to heavy     Reports low energy  all the time  No chest pain, SOB, dizziness, syncope   Never needed IV iron   Blood transfusion with vaginal delivery (2015)  No NSAIDs, ASA, anticoagulation     Does eat gluten    -Baseline bloat that worsens with meals     On iron, she gets passes dark green stool that are firm. No tar like stools.   No abd pain.   Bowel Habits - every 2 days will pass 1 stool; firm consistency. On toilet for 5-10 min to stool   No recent straining but previously did strain. Good evacuation.   Occasional use of senna tea.     Weight - stable   Appetite - stable     Wt Readings from Last 10 Encounters:   12/03/24 101.9 kg (224 lb 9.6 oz)   03/08/24 98.1 kg (216 lb 3.2 oz)   03/07/24 98.9 kg (218 lb)   05/16/16 102.9 kg (226 lb 12.8 oz)   08/17/15 109.8 kg (242 lb)   10/12/06 78 kg (172 lb) (94%, Z= 1.57)*     * Growth percentiles are based on [facility] (Girls, 2-20 Years) data.       ROS  Denies fevers, chills, weight loss, nausea, emesis, dysphagia, odynophagia, heartburn, abdominal pain,  black tarrying stools, bloody stools, rectal pain, rectal fullness.       Family Hx    No other known family history or GI related malignancy (esophageal, gastric, pancreatic, liver or colon) or family history of IBD/celiac disease.     Social Hx     No use of NSAIDs   No use of OTC herbal supplements/weight loss products.        Occasional ETOH - 2x a month, seltzer (white claw), 2-3 cans   Occasional hookah  No cigarette smoking/ tobacco products     PROBLEM LIST  Patient Active Problem List    Diagnosis Date Noted    Chronic pain of right knee 10/31/2019     Priority: Medium    Obesity (BMI 30-39.9) 04/03/2017     Priority: Medium    Abnormal uterine bleeding 04/03/2017     Priority: Medium    Pregnant 08/18/2015     Priority: Medium    Premature uterine contractions 05/30/2015     Priority: Medium    Tumors of body of pregnant uterus in pregnancy 02/16/2015     Priority: Medium     Overview:   Multiple small        Uterine leiomyoma 02/16/2015     Priority: Medium     Formatting of this note might be different from the original.   Multiple small      Overview:    Overview:    Multiple small      Supervision of normal first  pregnancy 01/15/2015     Priority: Medium     Overview:   lmp is sure. Will get first trimister screen and dating confirmation  Partner: Sina  First baby for both  Tx for BV at first visit  Unsure of which hospital        Other specified diseases and conditions complicating pregnancy 12/26/2014     Priority: Medium    Constipation in pregnancy 12/26/2014     Priority: Medium     PERTINENT PAST MEDICAL HISTORY:  No past medical history on file.    PREVIOUS SURGERIES:  No past surgical history on file.    ALLERGIES:   No Known Allergies    PERTINENT MEDICATIONS:    Current Outpatient Medications:     docusate sodium (COLACE) 100 MG capsule, Take 100 mg by mouth daily., Disp: , Rfl:     ferrous sulfate (FEROSUL) 325 (65 Fe) MG tablet, Take 1 tablet (325 mg) by mouth 2 times daily (Patient not taking: Reported on 12/3/2024), Disp: 180 tablet, Rfl: 3    sulfacetamide (BLEPH-10) 10 % ophthalmic solution, Place 1-2 drops into the right eye every 2 hours (while awake)., Disp: 5 mL, Rfl: 0    SOCIAL HISTORY:  Social History     Socioeconomic History    Marital status: Single     Spouse name: Not on file    Number of children: Not on file    Years of education: Not on file    Highest education level: Not on file   Occupational History    Not on file   Tobacco Use    Smoking status: Never     Passive exposure: Never    Smokeless tobacco: Never   Vaping Use    Vaping status: Never Used   Substance and Sexual Activity    Alcohol use: No    Drug use: No    Sexual activity: Yes     Partners: Male   Other Topics Concern    Not on file   Social History Narrative    Not on file     Social Drivers of Health     Financial Resource Strain: Low Risk  (3/7/2024)    Financial Resource Strain     Within the past 12 months, have you or your family members you live with been unable to get utilities (heat, electricity) when it was really needed?: No   Food Insecurity: Low Risk  (3/7/2024)    Food Insecurity     Within the past 12 months, did  you worry that your food would run out before you got money to buy more?: No     Within the past 12 months, did the food you bought just not last and you didn t have money to get more?: No   Transportation Needs: Low Risk  (3/7/2024)    Transportation Needs     Within the past 12 months, has lack of transportation kept you from medical appointments, getting your medicines, non-medical meetings or appointments, work, or from getting things that you need?: No   Physical Activity: Not on file   Stress: Not on file   Social Connections: Not on file   Interpersonal Safety: Low Risk  (12/3/2024)    Interpersonal Safety     Do you feel physically and emotionally safe where you currently live?: Yes     Within the past 12 months, have you been hit, slapped, kicked or otherwise physically hurt by someone?: No     Within the past 12 months, have you been humiliated or emotionally abused in other ways by your partner or ex-partner?: No   Housing Stability: Low Risk  (3/7/2024)    Housing Stability     Do you have housing? : Yes     Are you worried about losing your housing?: No       FAMILY HISTORY:  Family History   Problem Relation Age of Onset    Colon Cancer Maternal Uncle     Alcoholism Paternal Uncle     Diabetes Maternal Grandmother        Past/family/social history reviewed and no changes    PHYSICAL EXAMINATION:  Vitals reviewed: LMP 11/16/2024   Video physical exam  General: Patient appears well in no acute distress.   Skin: No visualized rash or lesions on visualized skin  Eyes: EOMI, no erythema, sclera icterus or discharge noted  Resp: Appears to be breathing comfortably without accessory muscle usage, speaking in full sentences, no cough  MSK: Appears to have normal range of motion based on visualized movements  Neurologic: No apparent tremors, facial movements symmetric  Psych: affect normal, alert and oriented    PERTINENT STUDIES:    Office Visit on 12/03/2024   Component Date Value Ref Range Status    HIV  "Antigen Antibody Combo 12/03/2024 Nonreactive  Nonreactive Final    Hepatitis C Antibody 12/03/2024 Nonreactive  Nonreactive Final    Iron 12/03/2024 17 (L)  37 - 145 ug/dL Final    Iron Binding Capacity 12/03/2024 436 (H)  240 - 430 ug/dL Final    Iron Sat Index 12/03/2024 4 (L)  15 - 46 % Final    Ferritin 12/03/2024 12  6 - 175 ng/mL Final    WBC Count 12/03/2024 5.0  4.0 - 11.0 10e3/uL Final    RBC Count 12/03/2024 4.09  3.80 - 5.20 10e6/uL Final    Hemoglobin 12/03/2024 7.2 (LL)  11.7 - 15.7 g/dL Final    Hematocrit 12/03/2024 25.2 (L)  35.0 - 47.0 % Final    MCV 12/03/2024 62 (L)  78 - 100 fL Final    MCH 12/03/2024 17.6 (L)  26.5 - 33.0 pg Final    MCHC 12/03/2024 28.6 (L)  31.5 - 36.5 g/dL Final    RDW 12/03/2024 18.9 (H)  10.0 - 15.0 % Final    Platelet Count 12/03/2024 693 (H)  150 - 450 10e3/uL Final    % Neutrophils 12/03/2024 49  % Final    % Lymphocytes 12/03/2024 41  % Final    % Monocytes 12/03/2024 7  % Final    % Eosinophils 12/03/2024 2  % Final    % Basophils 12/03/2024 1  % Final    % Immature Granulocytes 12/03/2024 0  % Final    Absolute Neutrophils 12/03/2024 2.4  1.6 - 8.3 10e3/uL Final    Absolute Lymphocytes 12/03/2024 2.0  0.8 - 5.3 10e3/uL Final    Absolute Monocytes 12/03/2024 0.3  0.0 - 1.3 10e3/uL Final    Absolute Eosinophils 12/03/2024 0.1  0.0 - 0.7 10e3/uL Final    Absolute Basophils 12/03/2024 0.0  0.0 - 0.2 10e3/uL Final    Absolute Immature Granulocytes 12/03/2024 0.0  <=0.4 10e3/uL Final        Lab Results   Component Value Date    WBC 5.0 12/03/2024    WBC 8.8 03/07/2024    HGB 7.2 (LL) 12/03/2024    HGB 6.3 (LL) 03/07/2024     (H) 12/03/2024     (H) 03/07/2024        Liver Function Studies - No results for input(s): \"PROTTOTAL\", \"ALBUMIN\", \"BILITOTAL\", \"ALKPHOS\", \"AST\", \"ALT\", \"BILIDIRECT\" in the last 14832 hours.     PREVIOUS ENDOSCOPY    No results found for this or any previous visit.    "

## 2025-01-08 NOTE — PATIENT INSTRUCTIONS
It was a pleasure taking care of you today.  I've included a brief summary of our discussion and care plan from today's visit below.  Please review this information with your primary care provider.  _______________________________________________________________________    My recommendations are summarized as follows:    Blood work and stool studies ordered - please call to schedule an appt (labs number below)  Upper and lower endoscopy procedures ordered - please call to schedule by calling Procedures line   Go to ER for chest pain, shortness of breath, weakness, syncope (passing out) or worsening symptoms     Please call our clinic or send a [employer] message to us if you have any questions or concerns. [employer] messages are answered by your nurse or doctor typically within 24 hours.  Please allow extra time on weekends and holidays    Return to GI Clinic in 1-2 weeks after procedure to review your progress.    _______________________________________________________________________    How do I schedule labs, imaging studies, or procedures that were ordered in clinic today?      Labs: To schedule lab appointment at the [facility], use my chart or call [phone]. If you have a [place] lab closer to home where you are regularly seen you can give them a call.      Procedures: If a colonoscopy, upper endoscopy, breath test, esophageal manometry, or pH impedence was ordered today, our endoscopy team will call you to schedule this. If you have not heard from our endoscopy team within a week, please call [phone] option 2 to schedule.      Imaging Studies: If you were scheduled for a CT scan, X-ray, MRI, ultrasound, HIDA scan, EKG or other imaging study, please call [phone] to have this scheduled.      Referral: If a referral to another specialty was ordered, expect a phone call or follow instructions above. If you have not heard from anyone regarding your referral in a week, please call our  clinic to check the status.      Who do I call with any questions after my visit?  Please be in touch if there are any further questions that arise following today's visit.  There are multiple ways to contact your gastroenterology care team.       During business hours, you may reach a Gastroenterology nurse at [phone]     To schedule or reschedule an appointment, please call [phone].      You can always send a secure message through [employer].  [employer] messages are answered by your nurse or doctor typically within 24 hours.  Please allow extra time on weekends and holidays.       For urgent/emergent questions after business hours, you may reach the on-call GI Fellow by contacting the [facility]  at [phone].     How will I get the results of any tests ordered?    You will receive all of your results.  If you have signed up for [employer], any tests ordered at your visit will be available to you after your physician reviews them.  Typically this takes 1-2 weeks.  If there are urgent results that require a change in your care plan, your physician or nurse will call you to discuss the next steps.       What is [employer]?  [employer] is a secure way for you to access all of your healthcare records from the [facility].  It is a web based computer program, so you can sign on to it from any location.  It also allows you to send secure messages to your care team.  I recommend signing up for [employer] access if you have not already done so and are comfortable with using a computer.       How to I schedule a follow-up visit?  If you did not schedule a follow-up visit today, please call [phone] to schedule a follow-up office visit.      Sincerely,    Yaa Nguyễn PA-C  Gastroenterology

## 2025-01-08 NOTE — NURSING NOTE
Current patient location: [address] [place] [address]    Is the patient currently in the state of [place]? YES    Visit mode:VIDEO    If the visit is dropped, the patient can be reconnected by:VIDEO VISIT: Text to cell phone:   Telephone Information:   Mobile [phone]       Will anyone else be joining the visit? NO  (If patient encounters technical issues they should call [phone]    Are changes needed to the allergy or medication list? No    Are refills needed on medications prescribed by this physician? NO    Rooming Documentation:  Questionnaire(s) completed    Reason for visit: Consult    Rox CASTILLO

## 2025-01-08 NOTE — LETTER
1/8/2025      Tiffanie Lopez  [address] [place] [address]      Dear Colleague,    Thank you for referring your patient, Tiffanie Lopez, to the [facility] [place]. Please see a copy of my visit note below.    Virtual Visit Details    Type of service:  Video Visit     Originating Location (pt. Location): Home in [place]    Distant Location (provider location):  On-site  Platform used for Video Visit: Channing    Joined the call at 1/8/2025, 10:48:55 am.  Left the call at 1/8/2025, 11:21:59 am.  You were on the call for 33 minutes 4 seconds     GI CLINIC VISIT    CC/REFERRING MD:  Ignacio Forman  REASON FOR CONSULTATION: RUTH    ASSESSMENT/PLAN:  Tiffanie Lopez is a 34 year old year old female with PMHx significant to the  Physicians GI team for RUTH.  She reports long-standing iron deficiency since early adulthood, as early as 2013 to her recollection. Used iron tabs sporadically but also developed constipation with them. During pregnancy , she required a blood transfusion but denies other events requiring blood products. Never had IV iron infusion either.     She does report menorrhagia (not on OCP - stopped use secondary to irregular bleeding), having moderate to heavy bleeding (changing a tampon ultra and adult depends every hour for concern of leakage) for the first 3 days (duration 4-5d).      Endorses ongoing fatigue but denies dizziness, syncope, chest pain/pressure/tightness, SOB. No overt sx of GIB to include hematochezia, melena, hematemesis, abdominal pain.   Her mother is also anemic. She is not on blood thinners.     Recent CBC with Hgb 7.2/25.2, MCV 62 and ferritin level 12. In 3/2023, Hgb 6.3/23.4.     She otherwise feels well.     #RUTH  Discussed indication for GI specific work up to include bidirectional endoscopy and potentially VCE. Scope complications, r/b explained to patient.   -order h.pylori stool Ag  -order celiac serologies (eats gluten)    Future  consideration  1.can consider autoimmune gastritis labs dependent on EGD findings - no fhx of gastric disease include gastric cancer   2. SB evaluation to be considered     Orders Placed This Encounter   Procedures     Tissue transglutaminase shahriar IgA and IgG     IgA     Helicobacter pylori Antigen Stool     Adult GI  Referral - Procedure Only     Colorectal cancer screening: await cscope ordered today     RTC 1-2 weeks after scopes     Thank you for this consultation.  It was a pleasure to participate in the care of this patient; please contact me with any further questions.     Yaa Nguyễn PA-C    Follow up: As planned above. Today, I personally spent 33 minutes in direct face to face time with the patient, of which greater than 50% of the time was spent in patient education and counseling as described above. Approximately 19 minutes were spent on indirect care associated with the patient's consultation including but not limited to review of: patient medical records to date, clinic visits, hospital records, lab results, imaging studies, procedural documentation, and coordinating care with other providers. The findings from this review are summarized in the above note. All of the above accounted for a cumulative time of 52 minutes and was performed on the date of service.     CAMILO Lopez is a 34 year old year old female seeing the [facility] GI group for RUTH.     Hx  First told of RUTH back in 2013 told to take PO iron but use was erratic  During pregnancy - told she had iron deficiency as well . Required blood transfusion with delivery.   -not told anemic as teenager     No nose bleeds, hemoptysis, hemetesis, hematuria, hematochezia, black/melena like stools   Does have some occassional brising after bump extermites but nothing extensive     Menstural cycle -   -Off OCP (did not like unpredictability) - having to wear Depends as its so heavy.   -Tampon (ultra) and depends - changed every hour collective  over 2 days   First 3 days she will have moderate to heavy     Reports low energy  all the time  No chest pain, SOB, dizziness, syncope   Never needed IV iron   Blood transfusion with vaginal delivery (2015)  No NSAIDs, ASA, anticoagulation     Does eat gluten   -Baseline bloat that worsens with meals     On iron, she gets passes dark green stool that are firm. No tar like stools.   No abd pain.   Bowel Habits - every 2 days will pass 1 stool; firm consistency. On toilet for 5-10 min to stool   No recent straining but previously did strain. Good evacuation.   Occasional use of senna tea.     Weight - stable   Appetite - stable     Wt Readings from Last 10 Encounters:   12/03/24 101.9 kg (224 lb 9.6 oz)   03/08/24 98.1 kg (216 lb 3.2 oz)   03/07/24 98.9 kg (218 lb)   05/16/16 102.9 kg (226 lb 12.8 oz)   08/17/15 109.8 kg (242 lb)   10/12/06 78 kg (172 lb) (94%, Z= 1.57)*     * Growth percentiles are based on CDC (Girls, 2-20 Years) data.       ROS  Denies fevers, chills, weight loss, nausea, emesis, dysphagia, odynophagia, heartburn, abdominal pain,  black tarrying stools, bloody stools, rectal pain, rectal fullness.       Family Hx    No other known family history or GI related malignancy (esophageal, gastric, pancreatic, liver or colon) or family history of IBD/celiac disease.     Social Hx     No use of NSAIDs   No use of OTC herbal supplements/weight loss products.        Occasional ETOH - 2x a month, seltzer (white claw), 2-3 cans   Occasional hookah  No cigarette smoking/ tobacco products     PROBLEM LIST  Patient Active Problem List    Diagnosis Date Noted     Chronic pain of right knee 10/31/2019     Priority: Medium     Obesity (BMI 30-39.9) 04/03/2017     Priority: Medium     Abnormal uterine bleeding 04/03/2017     Priority: Medium     Pregnant 08/18/2015     Priority: Medium     Premature uterine contractions 05/30/2015     Priority: Medium     Tumors of body of pregnant uterus in pregnancy 02/16/2015      Priority: Medium     Overview:   Multiple small         Uterine leiomyoma 02/16/2015     Priority: Medium     Formatting of this note might be different from the original.   Multiple small      Overview:    Overview:    Multiple small       Supervision of normal first pregnancy 01/15/2015     Priority: Medium     Overview:   lmp is sure. Will get first trimister screen and dating confirmation  Partner: Sina  First baby for both  Tx for BV at first visit  Unsure of which hospital         Other specified diseases and conditions complicating pregnancy 12/26/2014     Priority: Medium     Constipation in pregnancy 12/26/2014     Priority: Medium     PERTINENT PAST MEDICAL HISTORY:  No past medical history on file.    PREVIOUS SURGERIES:  No past surgical history on file.    ALLERGIES:   No Known Allergies    PERTINENT MEDICATIONS:    Current Outpatient Medications:      docusate sodium (COLACE) 100 MG capsule, Take 100 mg by mouth daily., Disp: , Rfl:      ferrous sulfate (FEROSUL) 325 (65 Fe) MG tablet, Take 1 tablet (325 mg) by mouth 2 times daily (Patient not taking: Reported on 12/3/2024), Disp: 180 tablet, Rfl: 3     sulfacetamide (BLEPH-10) 10 % ophthalmic solution, Place 1-2 drops into the right eye every 2 hours (while awake)., Disp: 5 mL, Rfl: 0    SOCIAL HISTORY:  Social History     Socioeconomic History     Marital status: Single     Spouse name: Not on file     Number of children: Not on file     Years of education: Not on file     Highest education level: Not on file   Occupational History     Not on file   Tobacco Use     Smoking status: Never     Passive exposure: Never     Smokeless tobacco: Never   Vaping Use     Vaping status: Never Used   Substance and Sexual Activity     Alcohol use: No     Drug use: No     Sexual activity: Yes     Partners: Male   Other Topics Concern     Not on file   Social History Narrative     Not on file     Social Drivers of Health     Financial Resource Strain: Low Risk   (3/7/2024)    Financial Resource Strain      Within the past 12 months, have you or your family members you live with been unable to get utilities (heat, electricity) when it was really needed?: No   Food Insecurity: Low Risk  (3/7/2024)    Food Insecurity      Within the past 12 months, did you worry that your food would run out before you got money to buy more?: No      Within the past 12 months, did the food you bought just not last and you didn t have money to get more?: No   Transportation Needs: Low Risk  (3/7/2024)    Transportation Needs      Within the past 12 months, has lack of transportation kept you from medical appointments, getting your medicines, non-medical meetings or appointments, work, or from getting things that you need?: No   Physical Activity: Not on file   Stress: Not on file   Social Connections: Not on file   Interpersonal Safety: Low Risk  (12/3/2024)    Interpersonal Safety      Do you feel physically and emotionally safe where you currently live?: Yes      Within the past 12 months, have you been hit, slapped, kicked or otherwise physically hurt by someone?: No      Within the past 12 months, have you been humiliated or emotionally abused in other ways by your partner or ex-partner?: No   Housing Stability: Low Risk  (3/7/2024)    Housing Stability      Do you have housing? : Yes      Are you worried about losing your housing?: No       FAMILY HISTORY:  Family History   Problem Relation Age of Onset     Colon Cancer Maternal Uncle      Alcoholism Paternal Uncle      Diabetes Maternal Grandmother        Past/family/social history reviewed and no changes    PHYSICAL EXAMINATION:  Vitals reviewed: LMP 11/16/2024   Video physical exam  General: Patient appears well in no acute distress.   Skin: No visualized rash or lesions on visualized skin  Eyes: EOMI, no erythema, sclera icterus or discharge noted  Resp: Appears to be breathing comfortably without accessory muscle usage, speaking in  full sentences, no cough  MSK: Appears to have normal range of motion based on visualized movements  Neurologic: No apparent tremors, facial movements symmetric  Psych: affect normal, alert and oriented    PERTINENT STUDIES:    Office Visit on 12/03/2024   Component Date Value Ref Range Status     HIV Antigen Antibody Combo 12/03/2024 Nonreactive  Nonreactive Final     Hepatitis C Antibody 12/03/2024 Nonreactive  Nonreactive Final     Iron 12/03/2024 17 (L)  37 - 145 ug/dL Final     Iron Binding Capacity 12/03/2024 436 (H)  240 - 430 ug/dL Final     Iron Sat Index 12/03/2024 4 (L)  15 - 46 % Final     Ferritin 12/03/2024 12  6 - 175 ng/mL Final     WBC Count 12/03/2024 5.0  4.0 - 11.0 10e3/uL Final     RBC Count 12/03/2024 4.09  3.80 - 5.20 10e6/uL Final     Hemoglobin 12/03/2024 7.2 (LL)  11.7 - 15.7 g/dL Final     Hematocrit 12/03/2024 25.2 (L)  35.0 - 47.0 % Final     MCV 12/03/2024 62 (L)  78 - 100 fL Final     MCH 12/03/2024 17.6 (L)  26.5 - 33.0 pg Final     MCHC 12/03/2024 28.6 (L)  31.5 - 36.5 g/dL Final     RDW 12/03/2024 18.9 (H)  10.0 - 15.0 % Final     Platelet Count 12/03/2024 693 (H)  150 - 450 10e3/uL Final     % Neutrophils 12/03/2024 49  % Final     % Lymphocytes 12/03/2024 41  % Final     % Monocytes 12/03/2024 7  % Final     % Eosinophils 12/03/2024 2  % Final     % Basophils 12/03/2024 1  % Final     % Immature Granulocytes 12/03/2024 0  % Final     Absolute Neutrophils 12/03/2024 2.4  1.6 - 8.3 10e3/uL Final     Absolute Lymphocytes 12/03/2024 2.0  0.8 - 5.3 10e3/uL Final     Absolute Monocytes 12/03/2024 0.3  0.0 - 1.3 10e3/uL Final     Absolute Eosinophils 12/03/2024 0.1  0.0 - 0.7 10e3/uL Final     Absolute Basophils 12/03/2024 0.0  0.0 - 0.2 10e3/uL Final     Absolute Immature Granulocytes 12/03/2024 0.0  <=0.4 10e3/uL Final        Lab Results   Component Value Date    WBC 5.0 12/03/2024    WBC 8.8 03/07/2024    HGB 7.2 (LL) 12/03/2024    HGB 6.3 (LL) 03/07/2024     (H) 12/03/2024     " (H) 03/07/2024        Liver Function Studies - No results for input(s): \"PROTTOTAL\", \"ALBUMIN\", \"BILITOTAL\", \"ALKPHOS\", \"AST\", \"ALT\", \"BILIDIRECT\" in the last 52112 hours.     PREVIOUS ENDOSCOPY    No results found for this or any previous visit.      Again, thank you for allowing me to participate in the care of your patient.        Sincerely,        Yaa Nguyễn PA-C    Electronically signed"

## 2025-01-16 ENCOUNTER — HOSPITAL ENCOUNTER (OUTPATIENT)
Facility: CLINIC | Age: 35
End: 2025-01-16
Attending: STUDENT IN AN ORGANIZED HEALTH CARE EDUCATION/TRAINING PROGRAM | Admitting: STUDENT IN AN ORGANIZED HEALTH CARE EDUCATION/TRAINING PROGRAM
Payer: COMMERCIAL

## 2025-01-16 ENCOUNTER — TELEPHONE (OUTPATIENT)
Dept: GASTROENTEROLOGY | Facility: CLINIC | Age: 35
End: 2025-01-16
Payer: COMMERCIAL

## 2025-01-16 NOTE — TELEPHONE ENCOUNTER
"Endoscopy Scheduling Screen    Have you had any respiratory illness or flu-like symptoms in the last 10 days?  No    What is your communication preference for Instructions and/or Bowel Prep?   MyChart    What insurance is in the chart?  Other:  BCBS    Ordering/Referring Provider: YIFAN VIZCARRA   (If ordering provider performs procedure, schedule with ordering provider unless otherwise instructed. )    BMI: Estimated body mass index is 32.56 kg/m  as calculated from the following:    Height as of 12/3/24: 1.769 m (5' 9.65\").    Weight as of 12/3/24: 101.9 kg (224 lb 9.6 oz).     Sedation Ordered  moderate sedation.   If patient BMI > 50 do not schedule in ASC.    If patient BMI > 45 do not schedule at [facility].    Are you taking methadone or Suboxone?  NO, No RN review required.    Have you been diagnosed and are being treated for severe PTSD or severe anxiety?  NO, No RN review required.    Are you taking any prescription medications for pain 3 or more times per week?   NO, No RN review required.    Do you have a history of malignant hyperthermia?  No    (Females) Are you currently pregnant?   No     Have you been diagnosed or told you have pulmonary hypertension?   No    Do you have an LVAD?  No    Have you been told you have moderate to severe sleep apnea?  No.    Have you been told you have COPD, asthma, or any other lung disease?  No    Do you have any heart conditions?  No     Have you ever had or are you waiting for an organ transplant?  No. Continue scheduling, no site restrictions.    Have you had a stroke or transient ischemic attack (TIA aka \"mini stroke\" in the last 6 months?   No    Have you been diagnosed with or been told you have cirrhosis of the liver?   No.    Are you currently on dialysis?   No    Do you need assistance transferring?   No    BMI: Estimated body mass index is 32.56 kg/m  as calculated from the following:    Height as of 12/3/24: 1.769 m (5' 9.65\").    Weight as of 12/3/24: 101.9 kg (224 " lb 9.6 oz).     Is patients BMI > 40 and scheduling location [facility]?  No    Do you take an injectable or oral medication for weight loss or diabetes (excluding insulin)?  No    Do you take the medication Naltrexone?  No    Do you take blood thinners?  No       Prep   Are you currently on dialysis or do you have chronic kidney disease?  No    Do you have a diagnosis of diabetes?  No    Do you have a diagnosis of cystic fibrosis (CF)?  No    On a regular basis do you go 3 -5 days between bowel movements?  No    BMI > 40?  No    Preferred Pharmacy:    [id number] - [place], [place] - [address] AT [facility] [address]  [place] [facility] [address]  Phone: [phone] Fax: [phone]      Final Scheduling Details     Procedure scheduled  Colonoscopy / Upper endoscopy (EGD)    Surgeon:  ALEENA     Date of procedure:  1/27/2025     Pre-OP / PAC:   No - Not required for this site.    Location  [facility]  Low Hgb    Sedation   Moderate Sedation - Per order.      Patient Reminders:   You will receive a call from a Nurse to review instructions and health history.  This assessment must be completed prior to your procedure.  Failure to complete the Nurse assessment may result in the procedure being cancelled.      On the day of your procedure, please designate an adult(s) who can drive you home stay with you for the next 24 hours. The medicines used in the exam will make you sleepy. You will not be able to drive.      You cannot take public transportation, ride share services, or non-medical taxi service without a responsible caregiver.  Medical transport services are allowed with the requirement that a responsible caregiver will receive you at your destination.  We require that drivers and caregivers are confirmed prior to your procedure.

## 2025-01-17 ENCOUNTER — TELEPHONE (OUTPATIENT)
Dept: GASTROENTEROLOGY | Facility: CLINIC | Age: 35
End: 2025-01-17

## 2025-01-17 NOTE — TELEPHONE ENCOUNTER
Pre visit planning completed.      Procedure details:    Patient scheduled for Colonoscopy/Upper endoscopy (EGD) on 1.27.25.     Arrival time: 1200. Procedure time 1300    Facility location: [facility]; [address] [place] [address], [address] Floor, [place] [address]. Check in location: Main entrance at registration desk.    Sedation type: Conscious sedation     Pre op exam needed? No.    Indication for procedure: 34 yF with menorrhagia presenting for RUTH evaluation. no overt sx of GIB. no localizing sx. No FHX CRC.       Chart review:     Electronic implanted devices? No    Recent diagnosis of diverticulitis within the last 6 weeks? No      Medication review:    Diabetic? No    Anticoagulants? No    Weight loss medication/injectable? No GLP-1 medication per patient's medication list. Nursing to verify with pre-assessment call.    Other medication HOLDING recommendations:  Ferrous sulfate (iron supplements): HOLD 7 days before procedure.      Prep for procedure:     Bowel prep recommendation: Standard Miralax.   Due to: standard bowel prep    Procedure information and instructions sent via Gydget         Cassandra Green RN  Endoscopy Procedure Pre Assessment   [phone] option 2

## 2025-01-17 NOTE — TELEPHONE ENCOUNTER
Pre assessment completed for upcoming procedure.   (Please see previous telephone encounter notes for complete details)      Procedure details:    Arrival time and facility location reviewed.    Pre op exam needed? No.    Designated  policy reviewed. Instructed to have someone stay 6  hours post procedure.       Medication review:    Medications reviewed. Please see supporting documentation below. Holding recommendations discussed (if applicable).   Ferrous Sulfate (iron supplement): HOLD 7 days before procedure.      Prep for procedure:     Procedure prep instructions reviewed.        Any additional information needed:  N/A      Patient verbalized understanding and had no questions or concerns at this time.      Camille Lebron LPN  Endoscopy Procedure Pre Assessment   [phone] option 2

## 2025-01-22 ENCOUNTER — TELEPHONE (OUTPATIENT)
Dept: GASTROENTEROLOGY | Facility: CLINIC | Age: 35
End: 2025-01-22
Payer: COMMERCIAL

## 2025-01-22 NOTE — TELEPHONE ENCOUNTER
Caller: Tiffanie Lopez      Reason for Reschedule/Cancellation (please be detailed, any staff messages or encounters to note?):   Patient request to reschedule, not able to arrange for adult supervision post procedure    Did you cancel or rescheduled an EUS procedure? No.    Is screening questionnaire older than 3 months from the reschedule date.   If Yes, please complete screening questionnaire. No    Prior to reschedule please review:  Ordering Provider: Yaa Nguyễn PA-C in [facility]  Sedation Determined: MODERATE  Does patient have any ASC Exclusions, please identify?: NO    Notes on Cancelled Procedure:  Procedure: Upper and Lower Endoscopy [EGD and Colonoscopy]   Date: 1/27  Location: [facility]; [address] [facility], [address] [place] [address]   Surgeon: ALEENA    Rescheduled: Yes,   Procedure: Upper and Lower Endoscopy [EGD and Colonoscopy]    Date: 2/7   Location: [facility]; [address] [facility] [place] [address]    Surgeon: ELIZABETH   Sedation Level Scheduled  MODERATE ,  Reason for Sedation Level PER ORDER   Instructions updated and sent: JAYRO     Does patient need PAC or Pre -Op Rescheduled? : NO

## 2025-01-22 NOTE — TELEPHONE ENCOUNTER
Left Voicemail (1st Attempt) and Sent Mychart (1st Attempt) for the patient to call back and schedule the following:    Appointment type: Return  Provider: Yaa Nguyễn  Return date: around 1-2 weeks after procedure on 2/7/25  Specialty phone number: [phone]  Additional appointment(s) needed: labs  Additonal Notes: NA

## 2025-01-30 ENCOUNTER — LAB (OUTPATIENT)
Dept: LAB | Facility: CLINIC | Age: 35
End: 2025-01-30
Payer: COMMERCIAL

## 2025-01-30 DIAGNOSIS — D50.9 IRON DEFICIENCY ANEMIA, UNSPECIFIED IRON DEFICIENCY ANEMIA TYPE: ICD-10-CM

## 2025-01-30 LAB
BASOPHILS # BLD AUTO: 0.1 10E3/UL (ref 0–0.2)
BASOPHILS NFR BLD AUTO: 1 %
EOSINOPHIL # BLD AUTO: 0.2 10E3/UL (ref 0–0.7)
EOSINOPHIL NFR BLD AUTO: 3 %
ERYTHROCYTE [DISTWIDTH] IN BLOOD BY AUTOMATED COUNT: 24.9 % (ref 10–15)
HCT VFR BLD AUTO: 33.5 % (ref 35–47)
HGB BLD-MCNC: 10.4 G/DL (ref 11.7–15.7)
IMM GRANULOCYTES # BLD: 0 10E3/UL
IMM GRANULOCYTES NFR BLD: 0 %
IRON BINDING CAPACITY (ROCHE): 371 UG/DL (ref 240–430)
IRON SATN MFR SERPL: 14 % (ref 15–46)
IRON SERPL-MCNC: 51 UG/DL (ref 37–145)
LYMPHOCYTES # BLD AUTO: 2.2 10E3/UL (ref 0.8–5.3)
LYMPHOCYTES NFR BLD AUTO: 42 %
MCH RBC QN AUTO: 24 PG (ref 26.5–33)
MCHC RBC AUTO-ENTMCNC: 31 G/DL (ref 31.5–36.5)
MCV RBC AUTO: 77 FL (ref 78–100)
MONOCYTES # BLD AUTO: 0.2 10E3/UL (ref 0–1.3)
MONOCYTES NFR BLD AUTO: 4 %
NEUTROPHILS # BLD AUTO: 2.6 10E3/UL (ref 1.6–8.3)
NEUTROPHILS NFR BLD AUTO: 50 %
NRBC # BLD AUTO: 0 10E3/UL
NRBC BLD AUTO-RTO: 0 /100
PLATELET # BLD AUTO: 586 10E3/UL (ref 150–450)
RBC # BLD AUTO: 4.34 10E6/UL (ref 3.8–5.2)
WBC # BLD AUTO: 5.2 10E3/UL (ref 4–11)

## 2025-02-12 ASSESSMENT — ANXIETY QUESTIONNAIRES
4. TROUBLE RELAXING: NOT AT ALL
3. WORRYING TOO MUCH ABOUT DIFFERENT THINGS: NOT AT ALL
1. FEELING NERVOUS, ANXIOUS, OR ON EDGE: NOT AT ALL
6. BECOMING EASILY ANNOYED OR IRRITABLE: NOT AT ALL
2. NOT BEING ABLE TO STOP OR CONTROL WORRYING: NOT AT ALL
7. FEELING AFRAID AS IF SOMETHING AWFUL MIGHT HAPPEN: NOT AT ALL
5. BEING SO RESTLESS THAT IT IS HARD TO SIT STILL: NOT AT ALL
GAD7 TOTAL SCORE: 0
IF YOU CHECKED OFF ANY PROBLEMS ON THIS QUESTIONNAIRE, HOW DIFFICULT HAVE THESE PROBLEMS MADE IT FOR YOU TO DO YOUR WORK, TAKE CARE OF THINGS AT HOME, OR GET ALONG WITH OTHER PEOPLE: NOT DIFFICULT AT ALL
GAD7 TOTAL SCORE: 0
GAD7 TOTAL SCORE: 0
8. IF YOU CHECKED OFF ANY PROBLEMS, HOW DIFFICULT HAVE THESE MADE IT FOR YOU TO DO YOUR WORK, TAKE CARE OF THINGS AT HOME, OR GET ALONG WITH OTHER PEOPLE?: NOT DIFFICULT AT ALL
7. FEELING AFRAID AS IF SOMETHING AWFUL MIGHT HAPPEN: NOT AT ALL

## 2025-02-12 NOTE — PROGRESS NOTES
"[facility]    HPI: Tiffanie Lopez is a 34 year old  who presents to clinic today to discuss heavy menstrual bleeding. Has a history of heavy menstrual bleeding with last Hgb 7.2. Noticed fatigue with low hemoglobin, improved now that higher. Recently saw GI that identified HMB as potential cause. Last ultrasound in [facility] is from 2017 showing multiple uterine myomas with largest 4.8 cm at left fundus, has not had an ultrasound since.     Was on combined hormonal pills about 3 years ago. Not currently interested in pregnancy and considering no future pregnancies but not 100% certain. Likes how she feels like she knows her hormonal cycle and body better than when she was on combined hormonal pills and would like to avoid hormonal medications if possible. No significant cramping with periods and no other bulk symptoms from myomas. Her mom had an ablation and she is interested in hearing more about it.     Patient's last menstrual period was 2025 (exact date).    OB History:    2015 MAB 3/8/24    Gyn History:   Menses: monthly, last 5 days, first 1-3 days are heavy, changing products every hour. Bleeding got heavier about 1.5 years ago. No inciting factor.   Contraception: condoms   Sexual activity: none currently, male in the past   Pap smear history: 23 NILM, HPV neg    Labs:   From 24  Hgb 10.4  Ferrritin 12 12/3/24    Imaging:   No recent imaging, last in 2017 with report reviewed    PMH, PSH, Family history, Social history, medications and allergies were reviewed and updated in EMR.     Objective:   /77   Pulse 73   Ht 1.769 m (5' 9.65\")   Wt 101.2 kg (223 lb 1.6 oz)   LMP 2025 (Exact Date)   BMI 32.33 kg/m    General: Healthy appearing. Alert, oriented. Affect is appropriate.   Abdomen: Soft, nontender, without masses or hernias. Uterus not palpable abdominally    Assessment/Plan:   Tiffanie Lopez is a 34 year old  female with heavy " menstrual bleeding leading to iron deficiency anemia who presents today to discuss options for management.     -Reviewed medical and surgical options including combined hormonal contraceptives, Mirena IUD, hysteroscopic myomectomy, endometrial ablation and hysterectomy.  She is not entirely certain that she does not desire future pregnancy and wants to think about this further.  Given we do not have pelvic ultrasound imaging since 2017 I recommended that we update this.  She would like to avoid hormonal medications if possible.  We talked about tranexamic acid during her menses to help decrease menstrual flow.  She would like to try this while pursuing further workup with pelvic ultrasound.  -Continue taking iron  -Pelvic ultrasound ordered  -TXA ordered, reviewed taking 1300 mg 3 times daily for the first 2-5 days of her period.  -If endometrial ablation continues to be desired discussed need for endometrial sampling.  Discussed that this may not be the best option for her given her age, lack of other contraception, lack of certainty about future fertility and risk of future endometrial hyperplasia/cancer.     Follow-up for further discussion after ultrasound    Nanda Lopez MD    Answers submitted by the patient for this visit:  Patient Health Questionnaire (G7) (Submitted on 2/12/2025)  MAINOR 7 TOTAL SCORE: 0

## 2025-02-17 ENCOUNTER — OFFICE VISIT (OUTPATIENT)
Dept: OBGYN | Facility: CLINIC | Age: 35
End: 2025-02-17
Attending: STUDENT IN AN ORGANIZED HEALTH CARE EDUCATION/TRAINING PROGRAM
Payer: COMMERCIAL

## 2025-02-17 VITALS
BODY MASS INDEX: 31.94 KG/M2 | HEART RATE: 73 BPM | HEIGHT: 70 IN | WEIGHT: 223.1 LBS | DIASTOLIC BLOOD PRESSURE: 77 MMHG | SYSTOLIC BLOOD PRESSURE: 116 MMHG

## 2025-02-17 DIAGNOSIS — N92.0 MENORRHAGIA WITH REGULAR CYCLE: Primary | ICD-10-CM

## 2025-02-17 PROCEDURE — 99204 OFFICE O/P NEW MOD 45 MIN: CPT | Performed by: STUDENT IN AN ORGANIZED HEALTH CARE EDUCATION/TRAINING PROGRAM

## 2025-02-17 PROCEDURE — 99212 OFFICE O/P EST SF 10 MIN: CPT | Performed by: STUDENT IN AN ORGANIZED HEALTH CARE EDUCATION/TRAINING PROGRAM

## 2025-02-17 RX ORDER — TRANEXAMIC ACID 650 MG/1
1300 TABLET ORAL 3 TIMES DAILY
Qty: 30 TABLET | Refills: 4 | Status: SHIPPED | OUTPATIENT
Start: 2025-02-17 | End: 2025-02-22

## 2025-02-17 NOTE — PATIENT INSTRUCTIONS
Thank you for trusting us with your care!   Please be aware, if you are on Mychart, you may see your results prior to your providers review. If labs are abnormal, we will call or message you on Rachiot with a follow up plan.    If you need to contact us for questions about:  Symptoms, Scheduling & Medical Questions; Non-urgent (2-3 day response) Gladitood message, Urgent (needing response today) [phone] (if after 3:30pm next day response)   Prescriptions: Please call your Pharmacy   Billing: Nate [phone] or [facility]:[phone]

## 2025-02-17 NOTE — LETTER
"2025       RE: Tiffanie Lopez  [address] [place] [address]     Dear Colleague,    Thank you for referring your patient, Tiffanie Lopez, to the [facility]OM[facility] at [facility]. Please see a copy of my visit note below.    Formerly [facility]    HPI: Tiffanie Lopez is a 34 year old  who presents to clinic today to discuss heavy menstrual bleeding. Has a history of heavy menstrual bleeding with last Hgb 7.2. Noticed fatigue with low hemoglobin, improved now that higher. Recently saw GI that identified HMB as potential cause. Last ultrasound in [facility] is from 2017 showing multiple uterine myomas with largest 4.8 cm at left fundus, has not had an ultrasound since.     Was on combined hormonal pills about 3 years ago. Not currently interested in pregnancy and considering no future pregnancies but not 100% certain. Likes how she feels like she knows her hormonal cycle and body better than when she was on combined hormonal pills and would like to avoid hormonal medications if possible. No significant cramping with periods and no other bulk symptoms from myomas. Her mom had an ablation and she is interested in hearing more about it.     Patient's last menstrual period was 2025 (exact date).    OB History:    2015  1 MAB 3/8/24    Gyn History:   Menses: monthly, last 5 days, first 1-3 days are heavy, changing products every hour. Bleeding got heavier about 1.5 years ago. No inciting factor.   Contraception: condoms   Sexual activity: none currently, male in the past   Pap smear history: 23 NILM, HPV neg    Labs:   From 25  Hgb 10.4  Ferrritin 12 12/3/24    Imaging:   No recent imaging, last in  with report reviewed    PMH, PSH, Family history, Social history, medications and allergies were reviewed and updated in EMR.     Objective:   /77   Pulse 73   Ht 1.769 m (5' 9.65\")   Wt 101.2 " kg (223 lb 1.6 oz)   LMP 2025 (Exact Date)   BMI 32.33 kg/m    General: Healthy appearing. Alert, oriented. Affect is appropriate.   Abdomen: Soft, nontender, without masses or hernias. Uterus not palpable abdominally    Assessment/Plan:   Tiffanie Lopez is a 34 year old  female with heavy menstrual bleeding leading to iron deficiency anemia who presents today to discuss options for management.     -Reviewed medical and surgical options including combined hormonal contraceptives, Mirena IUD, hysteroscopic myomectomy, endometrial ablation and hysterectomy.  She is not entirely certain that she does not desire future pregnancy and wants to think about this further.  Given we do not have pelvic ultrasound imaging since 2017 I recommended that we update this.  She would like to avoid hormonal medications if possible.  We talked about tranexamic acid during her menses to help decrease menstrual flow.  She would like to try this while pursuing further workup with pelvic ultrasound.  -Continue taking iron  -Pelvic ultrasound ordered  -TXA ordered, reviewed taking 1300 mg 3 times daily for the first 2-5 days of her period.  -If endometrial ablation continues to be desired discussed need for endometrial sampling.  Discussed that this may not be the best option for her given her age, lack of other contraception, lack of certainty about future fertility and risk of future endometrial hyperplasia/cancer.     Follow-up for further discussion after ultrasound    Nanda Lopez MD    Answers submitted by the patient for this visit:  Patient Health Questionnaire (G7) (Submitted on 2025)  MAINOR 7 TOTAL SCORE: 0      Again, thank you for allowing me to participate in the care of your patient.      Sincerely,    Nanda Lopez MD

## 2025-02-19 ENCOUNTER — HOSPITAL ENCOUNTER (OUTPATIENT)
Dept: ULTRASOUND IMAGING | Facility: HOSPITAL | Age: 35
Discharge: HOME | End: 2025-02-19
Attending: STUDENT IN AN ORGANIZED HEALTH CARE EDUCATION/TRAINING PROGRAM
Payer: COMMERCIAL

## 2025-02-19 DIAGNOSIS — N92.0 MENORRHAGIA WITH REGULAR CYCLE: ICD-10-CM

## 2025-02-19 PROCEDURE — 76830 TRANSVAGINAL US NON-OB: CPT

## 2025-02-21 ENCOUNTER — TELEPHONE (OUTPATIENT)
Dept: OBGYN | Facility: CLINIC | Age: 35
End: 2025-02-21
Payer: COMMERCIAL

## 2025-02-21 NOTE — TELEPHONE ENCOUNTER
[facility] Call Center    Phone Message    May a detailed message be left on voicemail: yes     Reason for Call: Appointment Intake    Referring Provider Name: Dr Lopez  Diagnosis and/or Symptoms: surgery    Pt looking to schedule surgery for fibroid removal. Please give pt a call to discuss.     Action Taken: Message routed to:  Other: [facility]    Travel Screening: Not Applicable     Date of Service:

## 2025-02-24 NOTE — TELEPHONE ENCOUNTER
"S-(situation): Returned Tiffanie's call about myomectomy.     B-(background): Hx significant HMB (hgb 6.3 -> 7.2 -> 10.4) -  seen on 2/17, where she was prescribed TXA and discussed medical and surgical options for fibroid management with Dr. Lopez.    Had pelvic US done on 2/19/25 to reassess fibroids. Per Dr. Lopez's result note:  \"Hi Tiffanie,     I looked at your ultrasound images and see you do have multiple fibroids with some pushing on the lining of the uterus. This can contribute to your heavy bleeding. One of the options to treat would be a hysteroscopic myomectomy which we can discuss further along with other options at your next appointment.     Thanks,     Nanda Lopez MD\"        A-(assessment): Called Tiffanie, who is hoping that Dr. Lopez will be willing to place the case request for the hysteroscopic myomectomy. She tried the TXA with her recent cycle but said it did not help at all -- she is worried about her next cycle lowering her hgb again.     R-(recommendations): Discussed that we typically require a consult appointment when deciding to operate on a patient -- while Dr. Lopez did a preliminary discussion on 2/17, she may want to go more in-depth when discussing surgical options. Routing to Dr. Lopez for her thoughts.    "

## 2025-02-25 ENCOUNTER — TELEPHONE (OUTPATIENT)
Dept: OBGYN | Facility: CLINIC | Age: 35
End: 2025-02-25
Payer: COMMERCIAL

## 2025-02-25 ENCOUNTER — PREP FOR PROCEDURE (OUTPATIENT)
Dept: OBGYN | Facility: CLINIC | Age: 35
End: 2025-02-25
Payer: COMMERCIAL

## 2025-02-25 DIAGNOSIS — D25.0 SUBMUCOUS MYOMA OF UTERUS: Primary | ICD-10-CM

## 2025-02-25 RX ORDER — ACETAMINOPHEN 325 MG/1
975 TABLET ORAL ONCE
OUTPATIENT
Start: 2025-02-25 | End: 2025-02-25

## 2025-02-26 ENCOUNTER — TELEPHONE (OUTPATIENT)
Dept: OBGYN | Facility: CLINIC | Age: 35
End: 2025-02-26
Payer: COMMERCIAL

## 2025-02-26 PROBLEM — D25.0 SUBMUCOUS MYOMA OF UTERUS: Status: ACTIVE | Noted: 2025-02-25

## 2025-02-26 NOTE — TELEPHONE ENCOUNTER
Spoke with patient, scheduled surgery. Patient is aware of date, time, location, prep instructions, need for H&P within 30 days.    Type of surgery: HYSTEROSCOPY, WITH DILATION AND CURETTAGE OF UTERUS USING MORCELLATOR   Location of surgery: [facility]  Date and time of surgery: 4/9/25 @ 9:40am  Surgeon: Dr. Nanda Lopez  Pre-Op H&P Date: 3/26 with Dr. Lopez  Post-Op Appt Date: 2-4 weeks after surgery   Packet sent out: Yes  Pre-cert/Authorization completed:  No  Date: 2/26/25

## 2025-03-10 ENCOUNTER — TELEPHONE (OUTPATIENT)
Dept: OBGYN | Facility: CLINIC | Age: 35
End: 2025-03-10
Payer: COMMERCIAL

## 2025-03-10 DIAGNOSIS — N92.0 MENORRHAGIA WITH REGULAR CYCLE: Primary | ICD-10-CM

## 2025-03-10 NOTE — TELEPHONE ENCOUNTER
[facility] Call Center    Phone Message    May a detailed message be left on voicemail: yes     Reason for Call: Other: Pt is requesting a call back to discuss getting the form of birthcontrol from her last visit. Please review and call back to advise at # [phone].     Action Taken: Message routed to:  Other: [facility]    Travel Screening: Not Applicable     Date of Service:

## 2025-03-11 RX ORDER — DESOGESTREL AND ETHINYL ESTRADIOL 0.15-0.03
1 KIT ORAL DAILY
Qty: 84 TABLET | Refills: 3 | Status: SHIPPED | OUTPATIENT
Start: 2025-03-11

## 2025-03-16 ENCOUNTER — HEALTH MAINTENANCE LETTER (OUTPATIENT)
Age: 35
End: 2025-03-16

## 2025-03-25 NOTE — PROGRESS NOTES
[facility]    HPI: Tiffanie Lopez is a 34 year old  who presents to clinic today for a follow-up of heavy menstrual bleeding resulting in anemia. Last ultrasound on  showed multiple myomas with several distorting endometrium. She is scheduled for a hysteroscopic myomectomy on 25.     Since her last visit she tried TXA 3 days with her last menses and did not feel like it helped.  With the most recent menses she tried it again and feels like it did help and used for 1-1/2 days.  She also plans to start the birth control pills today and continue them after surgery.  She is wondering if her low back pain is related to her fibroid uterus.     OB History:    2015  1 MAB 3/8/24    Gyn History:   Menses: monthly, last 5 days, first 1-3 days are heavy, changing products every hour. Bleeding got heavier about 1.5 years ago. No inciting factor.   Contraception: condoms   Sexual activity: none currently, male in the past   Pap smear history: 23 NILM, HPV neg    Labs:   From 25  Hgb 10.4  Ferrritin 12 12/3/24    Imaging:   Pelvic ultrasound 25  Images personally reviewed with patient    FINDINGS:   UTERUS: 10.1 x 8.5 x 6.9 cm. Multiple small to medium fibroids scattered throughout the uterus. Largest posterior fundus 3.3 cm. Mid posterior uterus 3.3 cm. Midline fundus 3.3 cm. Left lateral Posterior fundus 4.0 cm. Several other smaller fibroids.  ENDOMETRIUM: 8 mm. Endometrium distorted by the fibroids.  RIGHT OVARY: 4.5 x 2.8 x 2.5 cm. Normal.  LEFT OVARY: 4.1 x 2.0 x 1.9 cm. Normal.  No significant free fluid.                                                                 IMPRESSION:  1.  There are multiple uterine fibroids scattered throughout the uterus. 4 largest measured above.    PMH, PSH, Family history, Social history, medications and allergies were reviewed and updated in EMR.     Soc Hx: Mom will drive her to surgery and will stay overnight with her. No tobacco  "use.     Objective:   /79   Pulse 76   Ht 1.753 m (5' 9\")   Wt 100.2 kg (221 lb)   LMP 2025 (Exact Date)   BMI 32.64 kg/m    General: Healthy appearing. Alert, oriented. Affect is appropriate.   CV: Regular rate and rhythm  Resp: Lungs clear to auscultation bilaterally  Abdomen: Soft, nontender, without masses or hernias. Uterus not palpable abdominally    Assessment/Plan:   Tiffanie Lopez is a 34 year old  female with heavy menstrual bleeding leading to iron deficiency anemia who presents today to discuss hysteroscopic myomectomy.  She is scheduled for this on .  She is medically optimized for surgery and requires no further workup.     -Reviewed NPO timing  -Discussed that hysteroscopic myomectomy will not remove the fibroids but rather shave them down until flush with the surrounding endometrium.  This may result in reduction of the heaviness of her periods but will not change the timing.  She plans to continue birth control after the myomectomy.   -Reviewed other options to manage fibroids and AUB including endometrial ablation, Mirena IUD placement, myomectomy and hysterectomy  -Continue taking iron    Follow-up for scheduled surgery    Nanda Lopez MD    "

## 2025-03-26 ENCOUNTER — OFFICE VISIT (OUTPATIENT)
Dept: OBGYN | Facility: CLINIC | Age: 35
End: 2025-03-26
Attending: STUDENT IN AN ORGANIZED HEALTH CARE EDUCATION/TRAINING PROGRAM
Payer: COMMERCIAL

## 2025-03-26 VITALS
HEART RATE: 76 BPM | HEIGHT: 69 IN | WEIGHT: 221 LBS | SYSTOLIC BLOOD PRESSURE: 130 MMHG | DIASTOLIC BLOOD PRESSURE: 79 MMHG | BODY MASS INDEX: 32.73 KG/M2

## 2025-03-26 DIAGNOSIS — D25.0 SUBMUCOUS MYOMA OF UTERUS: Primary | ICD-10-CM

## 2025-03-26 DIAGNOSIS — D50.0 IRON DEFICIENCY ANEMIA DUE TO CHRONIC BLOOD LOSS: ICD-10-CM

## 2025-03-26 PROCEDURE — 99213 OFFICE O/P EST LOW 20 MIN: CPT | Performed by: STUDENT IN AN ORGANIZED HEALTH CARE EDUCATION/TRAINING PROGRAM

## 2025-03-26 PROCEDURE — 3078F DIAST BP <80 MM HG: CPT | Performed by: STUDENT IN AN ORGANIZED HEALTH CARE EDUCATION/TRAINING PROGRAM

## 2025-03-26 PROCEDURE — 99214 OFFICE O/P EST MOD 30 MIN: CPT | Performed by: STUDENT IN AN ORGANIZED HEALTH CARE EDUCATION/TRAINING PROGRAM

## 2025-03-26 PROCEDURE — 3075F SYST BP GE 130 - 139MM HG: CPT | Performed by: STUDENT IN AN ORGANIZED HEALTH CARE EDUCATION/TRAINING PROGRAM

## 2025-03-26 NOTE — PATIENT INSTRUCTIONS
Thank you for trusting us with your care!   Please be aware, if you are on Mychart, you may see your results prior to your providers review. If labs are abnormal, we will call or message you on [employer] with a follow up plan.    If you need to contact us for questions about:  Symptoms, Scheduling & Medical Questions; Non-urgent (2-3 day response) [employer] message, Urgent (needing response today) [phone] (if after 3:30pm next day response)   Prescriptions: Please call your Pharmacy   Billing: Nate [phone] or [facility]:[phone]

## 2025-03-26 NOTE — NURSING NOTE
Chief Complaint   Patient presents with    Follow Up     Hysteroscopic myomectomy consult    Elsie Breaux LPN

## 2025-03-26 NOTE — LETTER
3/26/2025       RE: Tiffanie Lopez  [address] [place] [address]     Dear Colleague,    Thank you for referring your patient, Tiffanie Lopez, to the [facility]OMEN[facility]. Please see a copy of my visit note below.    Formerly [facility]    HPI: Tiffanie Lopez is a 34 year old  who presents to clinic today for a follow-up of heavy menstrual bleeding resulting in anemia. Last ultrasound on  showed multiple myomas with several distorting endometrium. She is scheduled for a hysteroscopic myomectomy on 25.     Since her last visit she tried TXA 3 days with her last menses and did not feel like it helped.  With the most recent menses she tried it again and feels like it did help and used for 1-1/2 days.  She also plans to start the birth control pills today and continue them after surgery.  She is wondering if her low back pain is related to her fibroid uterus.     OB History:    2015  1 MAB 3/8/24    Gyn History:   Menses: monthly, last 5 days, first 1-3 days are heavy, changing products every hour. Bleeding got heavier about 1.5 years ago. No inciting factor.   Contraception: condoms   Sexual activity: none currently, male in the past   Pap smear history: 23 NILM, HPV neg    Labs:   From 25  Hgb 10.4  Ferrritin 12 12/3/24    Imaging:   Pelvic ultrasound 25  Images personally reviewed with patient    FINDINGS:   UTERUS: 10.1 x 8.5 x 6.9 cm. Multiple small to medium fibroids scattered throughout the uterus. Largest posterior fundus 3.3 cm. Mid posterior uterus 3.3 cm. Midline fundus 3.3 cm. Left lateral Posterior fundus 4.0 cm. Several other smaller fibroids.  ENDOMETRIUM: 8 mm. Endometrium distorted by the fibroids.  RIGHT OVARY: 4.5 x 2.8 x 2.5 cm. Normal.  LEFT OVARY: 4.1 x 2.0 x 1.9 cm. Normal.  No significant free fluid.                                                          "        IMPRESSION:  1.  There are multiple uterine fibroids scattered throughout the uterus. 4 largest measured above.    PMH, PSH, Family history, Social history, medications and allergies were reviewed and updated in EMR.     Soc Hx: Mom will drive her to surgery and will stay overnight with her. No tobacco use.     Objective:   /79   Pulse 76   Ht 1.753 m (5' 9\")   Wt 100.2 kg (221 lb)   LMP 2025 (Exact Date)   BMI 32.64 kg/m    General: Healthy appearing. Alert, oriented. Affect is appropriate.   CV: Regular rate and rhythm  Resp: Lungs clear to auscultation bilaterally  Abdomen: Soft, nontender, without masses or hernias. Uterus not palpable abdominally    Assessment/Plan:   Tiffanie Lopez is a 34 year old  female with heavy menstrual bleeding leading to iron deficiency anemia who presents today to discuss hysteroscopic myomectomy.  She is scheduled for this on .  She is medically optimized for surgery and requires no further workup.     -Reviewed NPO timing  -Discussed that hysteroscopic myomectomy will not remove the fibroids but rather shave them down until flush with the surrounding endometrium.  This may result in reduction of the heaviness of her periods but will not change the timing.  She plans to continue birth control after the myomectomy.   -Reviewed other options to manage fibroids and AUB including endometrial ablation, Mirena IUD placement, myomectomy and hysterectomy  -Continue taking iron    Follow-up for scheduled surgery    Nanda Lopez MD      Again, thank you for allowing me to participate in the care of your patient.      Sincerely,    Nanda Lopez MD    "

## 2025-04-08 ENCOUNTER — ANESTHESIA EVENT (OUTPATIENT)
Dept: SURGERY | Facility: AMBULATORY SURGERY CENTER | Age: 35
End: 2025-04-08
Payer: COMMERCIAL

## 2025-04-09 ENCOUNTER — HOSPITAL ENCOUNTER (OUTPATIENT)
Facility: AMBULATORY SURGERY CENTER | Age: 35
Discharge: HOME | End: 2025-04-09
Attending: STUDENT IN AN ORGANIZED HEALTH CARE EDUCATION/TRAINING PROGRAM
Payer: COMMERCIAL

## 2025-04-09 ENCOUNTER — ANESTHESIA (OUTPATIENT)
Dept: SURGERY | Facility: AMBULATORY SURGERY CENTER | Age: 35
End: 2025-04-09
Payer: COMMERCIAL

## 2025-04-09 VITALS
TEMPERATURE: 96.9 F | DIASTOLIC BLOOD PRESSURE: 82 MMHG | SYSTOLIC BLOOD PRESSURE: 129 MMHG | WEIGHT: 221 LBS | RESPIRATION RATE: 16 BRPM | HEART RATE: 57 BPM | BODY MASS INDEX: 32.73 KG/M2 | HEIGHT: 69 IN | OXYGEN SATURATION: 100 %

## 2025-04-09 DIAGNOSIS — D25.0 SUBMUCOUS MYOMA OF UTERUS: ICD-10-CM

## 2025-04-09 DIAGNOSIS — N93.9 ABNORMAL UTERINE BLEEDING: Primary | ICD-10-CM

## 2025-04-09 LAB
HCG UR QL: NEGATIVE
INTERNAL QC OK POCT: NORMAL
POCT KIT EXPIRATION DATE: NORMAL
POCT KIT LOT NUMBER: NORMAL

## 2025-04-09 PROCEDURE — 88305 TISSUE EXAM BY PATHOLOGIST: CPT | Mod: 26 | Performed by: PATHOLOGY

## 2025-04-09 PROCEDURE — 88305 TISSUE EXAM BY PATHOLOGIST: CPT | Mod: TC | Performed by: STUDENT IN AN ORGANIZED HEALTH CARE EDUCATION/TRAINING PROGRAM

## 2025-04-09 RX ORDER — ONDANSETRON 2 MG/ML
4 INJECTION INTRAMUSCULAR; INTRAVENOUS EVERY 30 MIN PRN
Status: DISCONTINUED | OUTPATIENT
Start: 2025-04-09 | End: 2025-04-10 | Stop reason: HOSPADM

## 2025-04-09 RX ORDER — KETOROLAC TROMETHAMINE 30 MG/ML
INJECTION, SOLUTION INTRAMUSCULAR; INTRAVENOUS PRN
Status: DISCONTINUED | OUTPATIENT
Start: 2025-04-09 | End: 2025-04-09

## 2025-04-09 RX ORDER — ACETAMINOPHEN 325 MG/1
975 TABLET ORAL ONCE
Status: COMPLETED | OUTPATIENT
Start: 2025-04-09 | End: 2025-04-09

## 2025-04-09 RX ORDER — DIMENHYDRINATE 50 MG/ML
25 INJECTION, SOLUTION INTRAMUSCULAR; INTRAVENOUS
Status: DISCONTINUED | OUTPATIENT
Start: 2025-04-09 | End: 2025-04-10 | Stop reason: HOSPADM

## 2025-04-09 RX ORDER — OXYCODONE HYDROCHLORIDE 5 MG/1
5 TABLET ORAL
Status: DISCONTINUED | OUTPATIENT
Start: 2025-04-09 | End: 2025-04-10 | Stop reason: HOSPADM

## 2025-04-09 RX ORDER — ACETAMINOPHEN 325 MG/1
975 TABLET ORAL ONCE
Status: DISCONTINUED | OUTPATIENT
Start: 2025-04-09 | End: 2025-04-09 | Stop reason: HOSPADM

## 2025-04-09 RX ORDER — FENTANYL CITRATE 50 UG/ML
50 INJECTION, SOLUTION INTRAMUSCULAR; INTRAVENOUS EVERY 5 MIN PRN
Status: DISCONTINUED | OUTPATIENT
Start: 2025-04-09 | End: 2025-04-10 | Stop reason: HOSPADM

## 2025-04-09 RX ORDER — ONDANSETRON 4 MG/1
4 TABLET, ORALLY DISINTEGRATING ORAL EVERY 30 MIN PRN
Status: DISCONTINUED | OUTPATIENT
Start: 2025-04-09 | End: 2025-04-10 | Stop reason: HOSPADM

## 2025-04-09 RX ORDER — ACETAMINOPHEN 325 MG/1
975 TABLET ORAL ONCE
Status: DISCONTINUED | OUTPATIENT
Start: 2025-04-09 | End: 2025-04-10 | Stop reason: HOSPADM

## 2025-04-09 RX ORDER — DEXAMETHASONE SODIUM PHOSPHATE 10 MG/ML
4 INJECTION, SOLUTION INTRAMUSCULAR; INTRAVENOUS
Status: DISCONTINUED | OUTPATIENT
Start: 2025-04-09 | End: 2025-04-10 | Stop reason: HOSPADM

## 2025-04-09 RX ORDER — PROPOFOL 10 MG/ML
INJECTION, EMULSION INTRAVENOUS CONTINUOUS PRN
Status: DISCONTINUED | OUTPATIENT
Start: 2025-04-09 | End: 2025-04-09

## 2025-04-09 RX ORDER — IBUPROFEN 200 MG
800 TABLET ORAL ONCE
Status: DISCONTINUED | OUTPATIENT
Start: 2025-04-09 | End: 2025-04-10 | Stop reason: HOSPADM

## 2025-04-09 RX ORDER — VASOPRESSIN 20 [USP'U]/ML
10 INJECTION, SOLUTION INTRAVENOUS ONCE
Status: DISCONTINUED | OUTPATIENT
Start: 2025-04-09 | End: 2025-04-10 | Stop reason: HOSPADM

## 2025-04-09 RX ORDER — HALOPERIDOL 5 MG/ML
1 INJECTION INTRAMUSCULAR
Status: DISCONTINUED | OUTPATIENT
Start: 2025-04-09 | End: 2025-04-10 | Stop reason: HOSPADM

## 2025-04-09 RX ORDER — LABETALOL HYDROCHLORIDE 5 MG/ML
10 INJECTION, SOLUTION INTRAVENOUS
Status: DISCONTINUED | OUTPATIENT
Start: 2025-04-09 | End: 2025-04-10 | Stop reason: HOSPADM

## 2025-04-09 RX ORDER — LIDOCAINE 40 MG/G
CREAM TOPICAL
Status: DISCONTINUED | OUTPATIENT
Start: 2025-04-09 | End: 2025-04-09 | Stop reason: HOSPADM

## 2025-04-09 RX ORDER — SODIUM CHLORIDE, SODIUM LACTATE, POTASSIUM CHLORIDE, CALCIUM CHLORIDE 600; 310; 30; 20 MG/100ML; MG/100ML; MG/100ML; MG/100ML
INJECTION, SOLUTION INTRAVENOUS CONTINUOUS PRN
Status: DISCONTINUED | OUTPATIENT
Start: 2025-04-09 | End: 2025-04-09

## 2025-04-09 RX ORDER — LIDOCAINE HYDROCHLORIDE 20 MG/ML
INJECTION, SOLUTION INFILTRATION; PERINEURAL PRN
Status: DISCONTINUED | OUTPATIENT
Start: 2025-04-09 | End: 2025-04-09

## 2025-04-09 RX ORDER — SODIUM CHLORIDE, SODIUM LACTATE, POTASSIUM CHLORIDE, CALCIUM CHLORIDE 600; 310; 30; 20 MG/100ML; MG/100ML; MG/100ML; MG/100ML
INJECTION, SOLUTION INTRAVENOUS CONTINUOUS
Status: DISCONTINUED | OUTPATIENT
Start: 2025-04-09 | End: 2025-04-10 | Stop reason: HOSPADM

## 2025-04-09 RX ORDER — FENTANYL CITRATE 50 UG/ML
25 INJECTION, SOLUTION INTRAMUSCULAR; INTRAVENOUS EVERY 5 MIN PRN
Status: DISCONTINUED | OUTPATIENT
Start: 2025-04-09 | End: 2025-04-10 | Stop reason: HOSPADM

## 2025-04-09 RX ORDER — HYDROMORPHONE HYDROCHLORIDE 1 MG/ML
0.4 INJECTION, SOLUTION INTRAMUSCULAR; INTRAVENOUS; SUBCUTANEOUS EVERY 5 MIN PRN
Status: DISCONTINUED | OUTPATIENT
Start: 2025-04-09 | End: 2025-04-10 | Stop reason: HOSPADM

## 2025-04-09 RX ORDER — NALOXONE HYDROCHLORIDE 0.4 MG/ML
0.1 INJECTION, SOLUTION INTRAMUSCULAR; INTRAVENOUS; SUBCUTANEOUS
Status: DISCONTINUED | OUTPATIENT
Start: 2025-04-09 | End: 2025-04-10 | Stop reason: HOSPADM

## 2025-04-09 RX ORDER — ACETAMINOPHEN 325 MG/1
325-650 TABLET ORAL EVERY 6 HOURS PRN
COMMUNITY
Start: 2025-04-09 | End: 2025-04-09

## 2025-04-09 RX ORDER — PROPOFOL 10 MG/ML
INJECTION, EMULSION INTRAVENOUS PRN
Status: DISCONTINUED | OUTPATIENT
Start: 2025-04-09 | End: 2025-04-09

## 2025-04-09 RX ORDER — HYDROMORPHONE HYDROCHLORIDE 1 MG/ML
0.2 INJECTION, SOLUTION INTRAMUSCULAR; INTRAVENOUS; SUBCUTANEOUS EVERY 5 MIN PRN
Status: DISCONTINUED | OUTPATIENT
Start: 2025-04-09 | End: 2025-04-10 | Stop reason: HOSPADM

## 2025-04-09 RX ORDER — SODIUM CHLORIDE, SODIUM LACTATE, POTASSIUM CHLORIDE, CALCIUM CHLORIDE 600; 310; 30; 20 MG/100ML; MG/100ML; MG/100ML; MG/100ML
INJECTION, SOLUTION INTRAVENOUS CONTINUOUS
Status: DISCONTINUED | OUTPATIENT
Start: 2025-04-09 | End: 2025-04-09 | Stop reason: HOSPADM

## 2025-04-09 RX ORDER — ACETAMINOPHEN 325 MG/1
325-650 TABLET ORAL EVERY 6 HOURS PRN
Qty: 40 TABLET | Refills: 0 | Status: SHIPPED | OUTPATIENT
Start: 2025-04-09

## 2025-04-09 RX ORDER — ONDANSETRON 2 MG/ML
INJECTION INTRAMUSCULAR; INTRAVENOUS PRN
Status: DISCONTINUED | OUTPATIENT
Start: 2025-04-09 | End: 2025-04-09

## 2025-04-09 RX ORDER — DEXAMETHASONE SODIUM PHOSPHATE 4 MG/ML
INJECTION, SOLUTION INTRA-ARTICULAR; INTRALESIONAL; INTRAMUSCULAR; INTRAVENOUS; SOFT TISSUE PRN
Status: DISCONTINUED | OUTPATIENT
Start: 2025-04-09 | End: 2025-04-09

## 2025-04-09 RX ORDER — IBUPROFEN 600 MG/1
600 TABLET, FILM COATED ORAL EVERY 6 HOURS PRN
COMMUNITY
Start: 2025-04-09 | End: 2025-04-09

## 2025-04-09 RX ORDER — IBUPROFEN 600 MG/1
600 TABLET, FILM COATED ORAL EVERY 6 HOURS PRN
Qty: 40 TABLET | Refills: 0 | Status: SHIPPED | OUTPATIENT
Start: 2025-04-09

## 2025-04-09 RX ORDER — OXYCODONE HYDROCHLORIDE 5 MG/1
10 TABLET ORAL
Status: DISCONTINUED | OUTPATIENT
Start: 2025-04-09 | End: 2025-04-10 | Stop reason: HOSPADM

## 2025-04-09 RX ADMIN — PROPOFOL 50 MG: 10 INJECTION, EMULSION INTRAVENOUS at 08:56

## 2025-04-09 RX ADMIN — KETOROLAC TROMETHAMINE 30 MG: 30 INJECTION, SOLUTION INTRAMUSCULAR; INTRAVENOUS at 09:27

## 2025-04-09 RX ADMIN — ACETAMINOPHEN 975 MG: 325 TABLET ORAL at 07:14

## 2025-04-09 RX ADMIN — PROPOFOL 20 MG: 10 INJECTION, EMULSION INTRAVENOUS at 09:02

## 2025-04-09 RX ADMIN — ONDANSETRON 4 MG: 2 INJECTION INTRAMUSCULAR; INTRAVENOUS at 09:07

## 2025-04-09 RX ADMIN — DEXAMETHASONE SODIUM PHOSPHATE 4 MG: 4 INJECTION, SOLUTION INTRA-ARTICULAR; INTRALESIONAL; INTRAMUSCULAR; INTRAVENOUS; SOFT TISSUE at 09:02

## 2025-04-09 RX ADMIN — Medication 0.25 MG: at 09:05

## 2025-04-09 RX ADMIN — SODIUM CHLORIDE, SODIUM LACTATE, POTASSIUM CHLORIDE, CALCIUM CHLORIDE: 600; 310; 30; 20 INJECTION, SOLUTION INTRAVENOUS at 08:58

## 2025-04-09 RX ADMIN — PROPOFOL 30 MG: 10 INJECTION, EMULSION INTRAVENOUS at 09:08

## 2025-04-09 RX ADMIN — PROPOFOL 150 MCG/KG/MIN: 10 INJECTION, EMULSION INTRAVENOUS at 08:56

## 2025-04-09 RX ADMIN — Medication 0.25 MG: at 08:58

## 2025-04-09 RX ADMIN — LIDOCAINE HYDROCHLORIDE 100 MG: 20 INJECTION, SOLUTION INFILTRATION; PERINEURAL at 08:58

## 2025-04-09 NOTE — ANESTHESIA CARE TRANSFER NOTE
Patient: Tiffanie Lopez    Procedure: Procedure(s):  HYSTEROSCOPY, WITH DILATION AND CURETTAGE OF UTERUS USING MORCELLATOR       Diagnosis: Submucous myoma of uterus [D25.0]  Diagnosis Additional Information: No value filed.    Anesthesia Type:   MAC     Note:    Oropharynx: oropharynx clear of all foreign objects and spontaneously breathing  Level of Consciousness: awake  Oxygen Supplementation: room air    Independent Airway: airway patency satisfactory and stable  Dentition: dentition unchanged  Vital Signs Stable: post-procedure vital signs reviewed and stable  Report to RN Given: handoff report given  Patient transferred to: PACU  Comments: Awake, chatty.   Handoff Report: Identifed the Patient, Identified the Reponsible Provider, Reviewed the pertinent medical history, Discussed the surgical course, Reviewed Intra-OP anesthesia mangement and issues during anesthesia, Set expectations for post-procedure period and Allowed opportunity for questions and acknowledgement of understanding      Vitals:  Vitals Value Taken Time   /90    Temp 35.5  C (95.9  F) 04/09/25 0941   Pulse 98    Resp 14    SpO2 100 % 04/09/25 0941   Vitals shown include unfiled device data.    Electronically Signed By: JOHN Severino CRNA  April 9, 2025  9:42 AM

## 2025-04-09 NOTE — ANESTHESIA PREPROCEDURE EVALUATION
"Anesthesia Pre-Procedure Evaluation    Patient: Tiffanie Lopez   MRN: [id number] : 1990        Procedure : Procedure(s):  HYSTEROSCOPY, WITH DILATION AND CURETTAGE OF UTERUS USING MORCELLATOR          No past medical history on file.   History reviewed. No pertinent surgical history.   No Known Allergies   Social History     Tobacco Use    Smoking status: Never     Passive exposure: Never    Smokeless tobacco: Never   Substance Use Topics    Alcohol use: Yes     Comment: socially      Wt Readings from Last 1 Encounters:   25 100.2 kg (221 lb)        Anesthesia Evaluation            ROS/MED HX  ENT/Pulmonary:       Neurologic:       Cardiovascular:       METS/Exercise Tolerance:     Hematologic:       Musculoskeletal:       GI/Hepatic:       Renal/Genitourinary:       Endo:     (+)               Obesity,       Psychiatric/Substance Use:       Infectious Disease:       Malignancy:       Other:            Physical Exam    Airway        Mallampati: II       Respiratory Devices and Support         Dental       (+) Minor Abnormalities - some fillings, tiny chips      Cardiovascular          Rhythm and rate: regular     Pulmonary                   OUTSIDE LABS:  CBC:   Lab Results   Component Value Date    WBC 5.2 2025    WBC 5.0 2024    HGB 10.4 (L) 2025    HGB 7.2 (LL) 2024    HCT 33.5 (L) 2025    HCT 25.2 (L) 2024     (H) 2025     (H) 2024     BMP: No results found for: \"NA\", \"POTASSIUM\", \"CHLORIDE\", \"CO2\", \"BUN\", \"CR\", \"GLC\"  COAGS: No results found for: \"PTT\", \"INR\", \"FIBR\"  POC:   Lab Results   Component Value Date    HCG Negative 2025     HEPATIC: No results found for: \"ALBUMIN\", \"PROTTOTAL\", \"ALT\", \"AST\", \"GGT\", \"ALKPHOS\", \"BILITOTAL\", \"BILIDIRECT\", \"THONY\"  OTHER: No results found for: \"PH\", \"LACT\", \"A1C\", \"ASREN\", \"PHOS\", \"MAG\", \"LIPASE\", \"AMYLASE\", \"TSH\", \"T4\", \"T3\", \"CRP\", \"SED\"    Anesthesia Plan    ASA Status:  2    NPO Status:  " "NPO Appropriate    Anesthesia Type: MAC.     - Reason for MAC: immobility needed              Consents    Anesthesia Plan(s) and associated risks, benefits, and realistic alternatives discussed. Questions answered and patient/representative(s) expressed understanding.     - Discussed:     - Discussed with:  Patient            Postoperative Care            Comments:               Marv Lopez MD    Clinically Significant Risk Factors Present on Admission                             # Obesity: Estimated body mass index is 32.64 kg/m  as calculated from the following:    Height as of this encounter: 1.753 m (5' 9\").    Weight as of this encounter: 100.2 kg (221 lb).                "

## 2025-04-09 NOTE — DISCHARGE INSTRUCTIONS
[facility]  Home Care Following Anesthesia  For 24 hours after surgery:  Get plenty of rest.  A responsible adult must stay with you for at least 24 hours after you leave the surgery center.  Do not drive or use heavy equipment.  If you have weakness or tingling, don't drive or use heavy equipment until this feeling goes away.   Do not drink alcohol.   Avoid strenuous or risky activities.  Ask for help when climbing stairs.  You may feel lightheaded.  IF so, sit for a few minutes before standing.  Have someone help you get up.   If you have nausea (feel sick to your stomach): Drink only clear liquids such as apple juice, ginger ale, broth or 7-Up.  Rest may also help.  Be sure to drink enough fluids.  Move to a regular diet as you feel able.   You may have a slight fever.  Call the doctor if your fever is over 100 F (37.7 C) (taken under the tongue) or lasts longer than 24 hours.  You may have a dry mouth, a sore throat, muscle aches or trouble sleeping. These should go away after 24 hours.  Do not make important or legal decisions.   It is recommended to avoid smoking.               Tips for taking pain medications  To get the best pain relief possible, remember these points:  Take pain medications as directed, before pain becomes severe.  Pain medication can upset your stomach: taking it with food may help.  Constipation is a common side effect of pain medication. Drink plenty of  fluids.  Eat foods high in fiber. Take a stool softener if recommended by your doctor or pharmacist.  Do not drink alcohol, drive or operate machinery while taking pain medications.  Ask about other ways to control pain, such as with heat, ice or relaxation.    Tylenol/Acetaminophen Consumption    If you feel your pain relief is insufficient, you may take Tylenol/Acetaminophen in addition to your narcotic pain medication.   Be careful not to exceed 4,000 mg of Tylenol/Acetaminophen in a 24 hour  period from all sources.  If you are taking extra strength Tylenol/acetaminophen (500 mg), the maximum dose is 8 tablets in 24 hours.  If you are taking regular strength acetaminophen (325 mg), the maximum dose is 12 tablets in 24 hours.    Call a doctor for any of the following:  Signs of infection (fever, growing tenderness at the surgery site, a large amount of drainage or bleeding, severe pain, foul-smelling drainage, redness, swelling).  It has been over 8 to 10 hours since surgery and you are still not able to urinate (pass water).  Headache for over 24 hours.  Numbness, tingling or weakness the day after surgery (if you had spinal anesthesia).  Signs of Covid-19 infection (temperature over 100 degrees, shortness of breath, cough, loss of taste/smell, generalized body aches, persistent headache, chills, sore throat, nausea/vomiting/diarrhea)    Your doctor is:  Dr. Nanda Lopez, OB/GYN: [phone]                    After hours and weekends call the hospital @ [phone] and ask for the resident on call for:  OB/GYN  For emergency care, call the:  [facility] Emergency Department: [phone] (TTY for hearing impaired: [phone])

## 2025-04-09 NOTE — ANESTHESIA POSTPROCEDURE EVALUATION
Patient: Tiffanie Lopez    Procedure: Procedure(s):  HYSTEROSCOPY, WITH DILATION AND CURETTAGE OF UTERUS USING MORCELLATOR       Anesthesia Type:  MAC    Note:  Disposition: Outpatient   Postop Pain Control: Uneventful            Sign Out: Well controlled pain   PONV: No   Neuro/Psych: Uneventful            Sign Out: Acceptable/Baseline neuro status   Airway/Respiratory: Uneventful            Sign Out: Acceptable/Baseline resp. status   CV/Hemodynamics: Uneventful            Sign Out: Acceptable CV status; No obvious hypovolemia; No obvious fluid overload   Other NRE: NONE   DID A NON-ROUTINE EVENT OCCUR?            Last vitals:  Vitals Value Taken Time   /89 04/09/25 0945   Temp 35.6  C (96.08  F) 04/09/25 0946   Pulse 76 04/09/25 0945   Resp 16 04/09/25 0940   SpO2 100 % 04/09/25 0946   Vitals shown include unfiled device data.    Electronically Signed By: Marv Lopez MD  April 9, 2025  9:47 AM

## 2025-04-09 NOTE — OP NOTE
[facility]  Operative Note     Patient: Tiffanie Lopez  : 1990  MRN: [id number]     Date of Service: 2025     Pre-operative diagnosis:  1. Heavy menstrual bleeding  2. Suspected uterine myoma      Post-operative diagnosis:  1. Same as above      Procedure:   1. Hysteroscopy, morcellation of uterine myoma     Surgeon: Nanda Lopez MD  Anesthesia: Local with MAC     EBL: 10mL   Urine: not drained during the procedure  Fluid deficit: 1040mL      Specimens: uterine myoma  Complications: none     Findings: EUA revealed an approximately 4mm skin tag on the anterior left labia majora. Otherwise normal external genitalia. Normal cervix and anteverted uterus. Adnexa not palpated due to adiposity. Uterine cavity with thin endometrium. Both tubal ostia visualized. Posterior mid-uterine submucosal myoma. No other submucosal myomas seen.      Indications: Tiffanie Lopez is a 34 year old female with a history of HMB who had a uterine ultrasound that showed a submucosal myoma. Management options were discussed and she opted for hysteroscopic myomectomy and OCPs. Risks, benefits, and alternatives to the procedure were discussed. The patient's questions were answered, understanding confirmed, and the patient signed written informed consent.     Technique: The patient was taken to the operating room where she was placed in the dorsal lithotomy position with feet in yellow fin stirrups. The patient was placed under MAC. An exam under anesthesia was performed. The patient was prepped and draped in the usual sterile fashion. A speculum was placed in the vagina and the cervix visualized. A single-toothed tenaculum was placed on the anterior lip of the cervix. 20cc of 1% lidocaine with vasopressin was used to perform a paracervical block.The cervical os was carefully dilated to 25F with sequential dilators. The hysteroscope was placed through the cervix into the uterine cavity.  Patient information - neck pain    Clinic hours:  Monday 8:30 am - 4:30 pm  Tuesday 10:30 am - 6:00 pm  Wednesday 8:00 am - 4:30 pm  Thursday 8:30 am - 4:30 pm  Friday  7:30 am - 3:30 pm      If you need a refill on your prescription, please call your pharmacy and let them know. Please be proactive and call before your medication runs out. The pharmacy will then contact us for the refill. Please allow 24-48 hours for the refill to be processed.     If your physician has ordered additional laboratory or radiology testing as part of your ongoing plan of care, please allow 7-10 business days from the day of your lab draw or test for the results to be sent and reviewed by your provider. If your results are critical and require more immediate intervention, you will be contacted sooner. Your results will be conveyed to you via a phone call or letter.    You may be receiving a patient satisfaction survey in the mail. Please take the time to complete, as your feedback is very important to us. We strive to make your experience exceptional and your comments help us with that goal. We look forward to hearing from you.     Examination of the uterine cavity demonstrated findings as described above. Pictures were taken. The Myosure reach was used to morcellate the posterior uterine myoma until reaching a fluid deficit of 1L. At this point the myoma was flush with surrounding endometrium. Pictures were taken. The hysteroscope was removed and total of 1040mL fluid deficit of normal saline noted. Tenaculum site was hemostatic. The speculum was removed from the vagina.     Instrument counts were correct x2. The patient was awoken in the OR and transferred to the PACU in stable condition.     Nanda Lopez MD

## 2025-04-28 ENCOUNTER — TELEPHONE (OUTPATIENT)
Dept: OBGYN | Facility: CLINIC | Age: 35
End: 2025-04-28
Payer: COMMERCIAL

## 2025-04-28 NOTE — TELEPHONE ENCOUNTER
[facility] Call Center    Phone Message    May a detailed message be left on voicemail: yes     Reason for Call: Other: Patient had to cancel  her post op today due to car issues. Wondering if Dr. Lopez can get her in soon as writer is unable to schedule until end of May. Please advise. Thank you!     Action Taken: Message routed to:  Other: S    Travel Screening: Not Applicable     Date of Service:

## 2025-04-29 NOTE — PROGRESS NOTES
[facility]    Reason for visit: post-op visit    HPI: Tiffanie Lopez is a 34 year old  who presents to clinic today for a postoperative visit following a hysteroscopic myomectomy on 25 for HMB. Since surgery she has had a period that remained heavy but not painful. Continuing to use OCPs. Would eventually like an endometrial ablation in order to stop exogenous hormones.     Findings: EUA revealed an approximately 4mm skin tag on the anterior left labia majora. Otherwise normal external genitalia. Normal cervix and anteverted uterus. Adnexa not palpated due to adiposity. Uterine cavity with thin endometrium. Both tubal ostia visualized. Posterior mid-uterine submucosal myoma. No other submucosal myomas seen.     Final Diagnosis   Uterus, uterine fibroids, curettage:  - Secretory-type endometrium without atypia  - Fragmented leiomyoma(s)   - Negative for malignancy     Objective:  BP (!) 141/84   Pulse 57   Wt 101.2 kg (223 lb)   LMP 2025   BMI 32.93 kg/m    General: Well-appearing    Assessment and plan:  -Normal postoperative recovery  -Operative findings and pathology reviewed.  Operative pictures were reviewed  -May return to full activity including intercourse  -Discussed options for HMB. Unable to access her intramural myomas further through hysteroscopic means. Discussed OCPs, LNG-IUD, endometrial ablation and hysterectomy. Her mother had a good experience with endometrial ablation. Discussed risks of failure of endometrial ablation, pain following ablation, inability to sample endometrium following ablation.  She was given ACOG resources on both endometrial ablation and hysterectomy.  She plans to continue using OCPs and see what her periods look like until this summer and will reach out if she desires a further surgery this year.  - Also offered removal of the skin tag on her anterior labia majora either in office or at the time of the subsequent procedure    Nanda  MD Jessica

## 2025-04-30 ENCOUNTER — OFFICE VISIT (OUTPATIENT)
Dept: OBGYN | Facility: CLINIC | Age: 35
End: 2025-04-30
Attending: STUDENT IN AN ORGANIZED HEALTH CARE EDUCATION/TRAINING PROGRAM
Payer: COMMERCIAL

## 2025-04-30 VITALS
SYSTOLIC BLOOD PRESSURE: 141 MMHG | BODY MASS INDEX: 32.93 KG/M2 | DIASTOLIC BLOOD PRESSURE: 84 MMHG | WEIGHT: 223 LBS | HEART RATE: 57 BPM

## 2025-04-30 DIAGNOSIS — N92.0 MENORRHAGIA WITH REGULAR CYCLE: Primary | ICD-10-CM

## 2025-04-30 DIAGNOSIS — D21.9 MYOMA: ICD-10-CM

## 2025-04-30 PROCEDURE — 99213 OFFICE O/P EST LOW 20 MIN: CPT | Performed by: STUDENT IN AN ORGANIZED HEALTH CARE EDUCATION/TRAINING PROGRAM

## 2025-04-30 NOTE — LETTER
2025       RE: Tiffanie Lopez  [address] [place] [address]     Dear Colleague,    Thank you for referring your patient, Tiffanie Lopez, to the [facility] [place] at [facility]. Please see a copy of my visit note below.    [facility]    Reason for visit: post-op visit    HPI: Tiffanie Lopez is a 34 year old  who presents to clinic today for a postoperative visit following a hysteroscopic myomectomy on 25 for HMB. Since surgery she has had a period that remained heavy but not painful. Continuing to use OCPs. Would eventually like an endometrial ablation in order to stop exogenous hormones.     Findings: EUA revealed an approximately 4mm skin tag on the anterior left labia majora. Otherwise normal external genitalia. Normal cervix and anteverted uterus. Adnexa not palpated due to adiposity. Uterine cavity with thin endometrium. Both tubal ostia visualized. Posterior mid-uterine submucosal myoma. No other submucosal myomas seen.     Final Diagnosis   Uterus, uterine fibroids, curettage:  - Secretory-type endometrium without atypia  - Fragmented leiomyoma(s)   - Negative for malignancy     Objective:  BP (!) 141/84   Pulse 57   Wt 101.2 kg (223 lb)   LMP 2025   BMI 32.93 kg/m    General: Well-appearing    Assessment and plan:  -Normal postoperative recovery  -Operative findings and pathology reviewed.  Operative pictures were reviewed  -May return to full activity including intercourse  -Discussed options for HMB. Unable to access her intramural myomas further through hysteroscopic means. Discussed OCPs, LNG-IUD, endometrial ablation and hysterectomy. Her mother had a good experience with endometrial ablation. Discussed risks of failure of endometrial ablation, pain following ablation, inability to sample endometrium following ablation.  She was given ACOG resources on both endometrial  ablation and hysterectomy.  She plans to continue using OCPs and see what her periods look like until this summer and will reach out if she desires a further surgery this year.  - Also offered removal of the skin tag on her anterior labia majora either in office or at the time of the subsequent procedure    Nanda Lopez MD      Again, thank you for allowing me to participate in the care of your patient.      Sincerely,    Nanda Lopez MD

## 2025-04-30 NOTE — NURSING NOTE
Chief Complaint   Patient presents with    Surgical Followup     DOS 4/9/2025 Hysteroscopy with D&C     Elsie Breaux LPN

## 2025-07-23 ENCOUNTER — TELEPHONE (OUTPATIENT)
Dept: OBGYN | Facility: CLINIC | Age: 35
End: 2025-07-23
Payer: COMMERCIAL

## 2025-07-23 NOTE — TELEPHONE ENCOUNTER
RECEIVED CALL FROM [facility] stating pt was calling for a termination.      When patient was transferred to this writer- patient states she is calling for an ABLATION procedure.     Arline COULTER RN - [facility]

## 2025-07-29 ENCOUNTER — TELEPHONE (OUTPATIENT)
Dept: OBGYN | Facility: CLINIC | Age: 35
End: 2025-07-29
Payer: COMMERCIAL

## 2025-07-29 NOTE — TELEPHONE ENCOUNTER
Patient calls stating that she is ready to proceed with scheduling an endometrial ablation. Let her know I will send a message to Dr. Lopez to  let her know the patient is ready to proceed and get a case request.

## 2025-08-11 ENCOUNTER — PREP FOR PROCEDURE (OUTPATIENT)
Dept: OBGYN | Facility: CLINIC | Age: 35
End: 2025-08-11
Payer: COMMERCIAL

## 2025-08-11 DIAGNOSIS — N92.0 MENORRHAGIA WITH REGULAR CYCLE: Primary | ICD-10-CM

## 2025-08-11 RX ORDER — ACETAMINOPHEN 325 MG/1
975 TABLET ORAL ONCE
OUTPATIENT
Start: 2025-08-11 | End: 2025-08-11

## 2025-08-13 ENCOUNTER — TELEPHONE (OUTPATIENT)
Dept: OBGYN | Facility: CLINIC | Age: 35
End: 2025-08-13
Payer: COMMERCIAL

## 2025-08-25 ENCOUNTER — PRE VISIT (OUTPATIENT)
Dept: SURGERY | Facility: CLINIC | Age: 35
End: 2025-08-25

## 2025-08-25 ENCOUNTER — VIRTUAL VISIT (OUTPATIENT)
Dept: SURGERY | Facility: CLINIC | Age: 35
End: 2025-08-25
Payer: COMMERCIAL

## 2025-08-25 VITALS — HEIGHT: 69 IN | BODY MASS INDEX: 33.77 KG/M2 | WEIGHT: 228 LBS

## 2025-08-25 DIAGNOSIS — Z01.818 PRE-OP EVALUATION: Primary | ICD-10-CM

## 2025-08-25 PROCEDURE — 98005 SYNCH AUDIO-VIDEO EST LOW 20: CPT | Performed by: PHYSICIAN ASSISTANT

## 2025-08-25 ASSESSMENT — LIFESTYLE VARIABLES: TOBACCO_USE: 0

## 2025-08-25 ASSESSMENT — ENCOUNTER SYMPTOMS: SEIZURES: 0

## 2025-08-25 ASSESSMENT — PAIN SCALES - GENERAL: PAINLEVEL_OUTOF10: NO PAIN (0)

## (undated) DEVICE — STRAP KNEE/BODY 31143004

## (undated) DEVICE — SOLIDIFIER (USE FOR UP TO 1500CC) MSOLID1500

## (undated) DEVICE — PACK ASC GYN MINOR P15GMUMA

## (undated) DEVICE — GLOVE BIOGEL PI MICRO INDICATOR UNDERGLOVE SZ 6.5 48965

## (undated) DEVICE — SOL NACL 0.9% IRRIG 3000ML BAG 2B7477

## (undated) DEVICE — GLOVE BIOGEL PI MICRO SZ 6.5 48565

## (undated) DEVICE — PAD CHUX UNDERPAD 30X36" P3036C

## (undated) DEVICE — JELLY LUBRICATING SURGILUBE 2OZ TUBE

## (undated) DEVICE — SOL WATER IRRIG 500ML BOTTLE 2F7113

## (undated) DEVICE — Device

## (undated) DEVICE — DRAPE UNDER BUTTOCK 8483

## (undated) DEVICE — LINEN TOWEL PACK X5 5464

## (undated) DEVICE — PREP DYNA-HEX 4% CHG SCRUB 4OZ BOTTLE MDS098710

## (undated) DEVICE — SEAL SET MYOSURE ROD LENS SCOPE SINGLE USE 40-902

## (undated) DEVICE — SOL NACL 0.9% IRRIG 500ML BOTTLE 2F7123

## (undated) DEVICE — KIT PROCEDURE FLUENT IN/OUT FLOWPAK TISS TRAP FLT-112S

## (undated) RX ORDER — KETOROLAC TROMETHAMINE 30 MG/ML
INJECTION, SOLUTION INTRAMUSCULAR; INTRAVENOUS
Status: DISPENSED
Start: 2025-04-09

## (undated) RX ORDER — PROPOFOL 10 MG/ML
INJECTION, EMULSION INTRAVENOUS
Status: DISPENSED
Start: 2025-04-09

## (undated) RX ORDER — ONDANSETRON 2 MG/ML
INJECTION INTRAMUSCULAR; INTRAVENOUS
Status: DISPENSED
Start: 2025-04-09

## (undated) RX ORDER — VASOPRESSIN 20 [USP'U]/ML
INJECTION, SOLUTION INTRAVENOUS
Status: DISPENSED
Start: 2025-04-09

## (undated) RX ORDER — DEXAMETHASONE SODIUM PHOSPHATE 4 MG/ML
INJECTION, SOLUTION INTRA-ARTICULAR; INTRALESIONAL; INTRAMUSCULAR; INTRAVENOUS; SOFT TISSUE
Status: DISPENSED
Start: 2025-04-09

## (undated) RX ORDER — ACETAMINOPHEN 325 MG/1
TABLET ORAL
Status: DISPENSED
Start: 2025-04-09

## (undated) RX ORDER — HYDROMORPHONE HYDROCHLORIDE 1 MG/ML
INJECTION, SOLUTION INTRAMUSCULAR; INTRAVENOUS; SUBCUTANEOUS
Status: DISPENSED
Start: 2025-04-09